# Patient Record
Sex: FEMALE | Race: BLACK OR AFRICAN AMERICAN | Employment: OTHER | URBAN - METROPOLITAN AREA
[De-identification: names, ages, dates, MRNs, and addresses within clinical notes are randomized per-mention and may not be internally consistent; named-entity substitution may affect disease eponyms.]

---

## 2018-06-15 ENCOUNTER — APPOINTMENT (OUTPATIENT)
Dept: GENERAL RADIOLOGY | Age: 78
DRG: 698 | End: 2018-06-15
Attending: EMERGENCY MEDICINE
Payer: MEDICARE

## 2018-06-15 ENCOUNTER — HOSPITAL ENCOUNTER (INPATIENT)
Age: 78
LOS: 12 days | Discharge: NURSING, REHAB OR LONG-TERM CARE | DRG: 698 | End: 2018-06-27
Attending: EMERGENCY MEDICINE | Admitting: HOSPITALIST
Payer: MEDICARE

## 2018-06-15 DIAGNOSIS — M24.50 FLEXION CONTRACTURES: ICD-10-CM

## 2018-06-15 DIAGNOSIS — R53.81 DEBILITY: ICD-10-CM

## 2018-06-15 DIAGNOSIS — R50.9 FEVER, UNSPECIFIED FEVER CAUSE: Primary | ICD-10-CM

## 2018-06-15 DIAGNOSIS — R63.0 POOR APPETITE: ICD-10-CM

## 2018-06-15 DIAGNOSIS — R13.12 OROPHARYNGEAL DYSPHAGIA: ICD-10-CM

## 2018-06-15 DIAGNOSIS — R34 OLIGURIA: ICD-10-CM

## 2018-06-15 DIAGNOSIS — N39.0 URINARY TRACT INFECTION WITHOUT HEMATURIA, SITE UNSPECIFIED: ICD-10-CM

## 2018-06-15 PROBLEM — T83.511A CATHETER-ASSOCIATED URINARY TRACT INFECTION (HCC): Status: ACTIVE | Noted: 2018-06-15

## 2018-06-15 PROBLEM — R19.7 DIARRHEA: Status: ACTIVE | Noted: 2018-06-15

## 2018-06-15 PROBLEM — A41.9 SEPSIS (HCC): Status: ACTIVE | Noted: 2018-06-15

## 2018-06-15 PROBLEM — R53.2 FUNCTIONAL QUADRIPLEGIA (HCC): Status: ACTIVE | Noted: 2018-06-15

## 2018-06-15 LAB
ALBUMIN SERPL-MCNC: 2.8 G/DL (ref 3.5–5)
ALBUMIN/GLOB SERPL: 0.6 {RATIO} (ref 1.1–2.2)
ALP SERPL-CCNC: 78 U/L (ref 45–117)
ALT SERPL-CCNC: 18 U/L (ref 12–78)
ANION GAP SERPL CALC-SCNC: 12 MMOL/L (ref 5–15)
APPEARANCE UR: ABNORMAL
AST SERPL-CCNC: 44 U/L (ref 15–37)
BACTERIA URNS QL MICRO: ABNORMAL /HPF
BASOPHILS # BLD: 0 K/UL (ref 0–0.1)
BASOPHILS NFR BLD: 0 % (ref 0–1)
BILIRUB SERPL-MCNC: 0.6 MG/DL (ref 0.2–1)
BILIRUB UR QL CFM: NEGATIVE
BUN SERPL-MCNC: 45 MG/DL (ref 6–20)
BUN/CREAT SERPL: 26 (ref 12–20)
CALCIUM SERPL-MCNC: 9 MG/DL (ref 8.5–10.1)
CHLORIDE SERPL-SCNC: 103 MMOL/L (ref 97–108)
CO2 SERPL-SCNC: 20 MMOL/L (ref 21–32)
COLOR UR: ABNORMAL
CREAT SERPL-MCNC: 1.76 MG/DL (ref 0.55–1.02)
DIFFERENTIAL METHOD BLD: ABNORMAL
EOSINOPHIL # BLD: 0 K/UL (ref 0–0.4)
EOSINOPHIL NFR BLD: 0 % (ref 0–7)
EPITH CASTS URNS QL MICRO: ABNORMAL /LPF
ERYTHROCYTE [DISTWIDTH] IN BLOOD BY AUTOMATED COUNT: 14.8 % (ref 11.5–14.5)
GLOBULIN SER CALC-MCNC: 4.6 G/DL (ref 2–4)
GLUCOSE SERPL-MCNC: 164 MG/DL (ref 65–100)
GLUCOSE UR STRIP.AUTO-MCNC: NEGATIVE MG/DL
HCT VFR BLD AUTO: 38.1 % (ref 35–47)
HGB BLD-MCNC: 12.8 G/DL (ref 11.5–16)
HGB UR QL STRIP: ABNORMAL
IMM GRANULOCYTES # BLD: 0 K/UL
IMM GRANULOCYTES NFR BLD AUTO: 0 %
KETONES UR QL STRIP.AUTO: ABNORMAL MG/DL
LACTATE SERPL-SCNC: 2.9 MMOL/L (ref 0.4–2)
LEUKOCYTE ESTERASE UR QL STRIP.AUTO: ABNORMAL
LYMPHOCYTES # BLD: 1 K/UL (ref 0.8–3.5)
LYMPHOCYTES NFR BLD: 7 % (ref 12–49)
MCH RBC QN AUTO: 31.3 PG (ref 26–34)
MCHC RBC AUTO-ENTMCNC: 33.6 G/DL (ref 30–36.5)
MCV RBC AUTO: 93.2 FL (ref 80–99)
MONOCYTES # BLD: 1.9 K/UL (ref 0–1)
MONOCYTES NFR BLD: 13 % (ref 5–13)
NEUTS BAND NFR BLD MANUAL: 17 % (ref 0–6)
NEUTS SEG # BLD: 11.4 K/UL (ref 1.8–8)
NEUTS SEG NFR BLD: 63 % (ref 32–75)
NITRITE UR QL STRIP.AUTO: NEGATIVE
NRBC # BLD: 0 K/UL (ref 0–0.01)
NRBC BLD-RTO: 0 PER 100 WBC
PH UR STRIP: 5 [PH] (ref 5–8)
PLATELET # BLD AUTO: 193 K/UL (ref 150–400)
PMV BLD AUTO: 10.9 FL (ref 8.9–12.9)
POTASSIUM SERPL-SCNC: 5.3 MMOL/L (ref 3.5–5.1)
PROT SERPL-MCNC: 7.4 G/DL (ref 6.4–8.2)
PROT UR STRIP-MCNC: 30 MG/DL
RBC # BLD AUTO: 4.09 M/UL (ref 3.8–5.2)
RBC #/AREA URNS HPF: ABNORMAL /HPF (ref 0–5)
RBC MORPH BLD: ABNORMAL
SODIUM SERPL-SCNC: 135 MMOL/L (ref 136–145)
SP GR UR REFRACTOMETRY: 1.02 (ref 1–1.03)
UA: UC IF INDICATED,UAUC: ABNORMAL
UROBILINOGEN UR QL STRIP.AUTO: 1 EU/DL (ref 0.2–1)
WBC # BLD AUTO: 14.3 K/UL (ref 3.6–11)
WBC URNS QL MICRO: ABNORMAL /HPF (ref 0–4)

## 2018-06-15 PROCEDURE — 74011250637 HC RX REV CODE- 250/637

## 2018-06-15 PROCEDURE — 74011250636 HC RX REV CODE- 250/636: Performed by: EMERGENCY MEDICINE

## 2018-06-15 PROCEDURE — 65660000001 HC RM ICU INTERMED STEPDOWN

## 2018-06-15 PROCEDURE — 93005 ELECTROCARDIOGRAM TRACING: CPT

## 2018-06-15 PROCEDURE — 96360 HYDRATION IV INFUSION INIT: CPT

## 2018-06-15 PROCEDURE — 80053 COMPREHEN METABOLIC PANEL: CPT | Performed by: EMERGENCY MEDICINE

## 2018-06-15 PROCEDURE — 87493 C DIFF AMPLIFIED PROBE: CPT | Performed by: HOSPITALIST

## 2018-06-15 PROCEDURE — 36415 COLL VENOUS BLD VENIPUNCTURE: CPT | Performed by: EMERGENCY MEDICINE

## 2018-06-15 PROCEDURE — 87086 URINE CULTURE/COLONY COUNT: CPT | Performed by: EMERGENCY MEDICINE

## 2018-06-15 PROCEDURE — 71045 X-RAY EXAM CHEST 1 VIEW: CPT

## 2018-06-15 PROCEDURE — 74011000258 HC RX REV CODE- 258: Performed by: EMERGENCY MEDICINE

## 2018-06-15 PROCEDURE — 99285 EMERGENCY DEPT VISIT HI MDM: CPT

## 2018-06-15 PROCEDURE — 74011250636 HC RX REV CODE- 250/636: Performed by: HOSPITALIST

## 2018-06-15 PROCEDURE — 87077 CULTURE AEROBIC IDENTIFY: CPT | Performed by: EMERGENCY MEDICINE

## 2018-06-15 PROCEDURE — 83605 ASSAY OF LACTIC ACID: CPT | Performed by: EMERGENCY MEDICINE

## 2018-06-15 PROCEDURE — 81001 URINALYSIS AUTO W/SCOPE: CPT | Performed by: EMERGENCY MEDICINE

## 2018-06-15 PROCEDURE — 85025 COMPLETE CBC W/AUTO DIFF WBC: CPT | Performed by: EMERGENCY MEDICINE

## 2018-06-15 PROCEDURE — 87186 SC STD MICRODIL/AGAR DIL: CPT | Performed by: EMERGENCY MEDICINE

## 2018-06-15 PROCEDURE — 87040 BLOOD CULTURE FOR BACTERIA: CPT | Performed by: EMERGENCY MEDICINE

## 2018-06-15 RX ORDER — LISINOPRIL 2.5 MG/1
2.5 TABLET ORAL DAILY
COMMUNITY

## 2018-06-15 RX ORDER — SENNOSIDES 8.6 MG/1
1 TABLET ORAL 2 TIMES DAILY
COMMUNITY
End: 2018-06-27

## 2018-06-15 RX ORDER — ACETAMINOPHEN 650 MG/1
650 SUPPOSITORY RECTAL
Status: COMPLETED | OUTPATIENT
Start: 2018-06-15 | End: 2018-06-15

## 2018-06-15 RX ORDER — THERA TABS 400 MCG
1 TAB ORAL DAILY
COMMUNITY

## 2018-06-15 RX ORDER — OXYCODONE AND ACETAMINOPHEN 5; 325 MG/1; MG/1
2 TABLET ORAL EVERY 8 HOURS
COMMUNITY
End: 2018-06-27

## 2018-06-15 RX ORDER — ATROPINE SULFATE 10 MG/ML
1-2 SOLUTION/ DROPS OPHTHALMIC 3 TIMES DAILY
COMMUNITY

## 2018-06-15 RX ORDER — ONDANSETRON 2 MG/ML
4 INJECTION INTRAMUSCULAR; INTRAVENOUS
Status: DISCONTINUED | OUTPATIENT
Start: 2018-06-15 | End: 2018-06-27 | Stop reason: HOSPADM

## 2018-06-15 RX ORDER — SODIUM CHLORIDE 9 MG/ML
50 INJECTION, SOLUTION INTRAVENOUS CONTINUOUS
Status: DISCONTINUED | OUTPATIENT
Start: 2018-06-15 | End: 2018-06-17

## 2018-06-15 RX ORDER — IPRATROPIUM BROMIDE AND ALBUTEROL SULFATE 2.5; .5 MG/3ML; MG/3ML
3 SOLUTION RESPIRATORY (INHALATION)
Status: DISCONTINUED | OUTPATIENT
Start: 2018-06-15 | End: 2018-06-27 | Stop reason: HOSPADM

## 2018-06-15 RX ORDER — LORATADINE 10 MG/1
10 TABLET ORAL
COMMUNITY

## 2018-06-15 RX ORDER — METRONIDAZOLE 500 MG/100ML
500 INJECTION, SOLUTION INTRAVENOUS EVERY 8 HOURS
Status: DISCONTINUED | OUTPATIENT
Start: 2018-06-15 | End: 2018-06-17

## 2018-06-15 RX ORDER — ACETAMINOPHEN 650 MG/1
SUPPOSITORY RECTAL
Status: COMPLETED
Start: 2018-06-15 | End: 2018-06-15

## 2018-06-15 RX ORDER — NALOXONE HYDROCHLORIDE 0.4 MG/ML
0.4 INJECTION, SOLUTION INTRAMUSCULAR; INTRAVENOUS; SUBCUTANEOUS AS NEEDED
Status: DISCONTINUED | OUTPATIENT
Start: 2018-06-15 | End: 2018-06-27 | Stop reason: HOSPADM

## 2018-06-15 RX ORDER — IPRATROPIUM BROMIDE AND ALBUTEROL SULFATE 2.5; .5 MG/3ML; MG/3ML
3 SOLUTION RESPIRATORY (INHALATION)
COMMUNITY

## 2018-06-15 RX ORDER — SODIUM CHLORIDE 0.9 % (FLUSH) 0.9 %
5-10 SYRINGE (ML) INJECTION EVERY 8 HOURS
Status: DISCONTINUED | OUTPATIENT
Start: 2018-06-15 | End: 2018-06-27 | Stop reason: HOSPADM

## 2018-06-15 RX ORDER — LOPERAMIDE HYDROCHLORIDE 2 MG/1
CAPSULE ORAL
COMMUNITY

## 2018-06-15 RX ORDER — GABAPENTIN 100 MG/1
100 CAPSULE ORAL 3 TIMES DAILY
COMMUNITY

## 2018-06-15 RX ORDER — PROMETHAZINE HYDROCHLORIDE 25 MG/1
25 TABLET ORAL
COMMUNITY

## 2018-06-15 RX ORDER — TAMSULOSIN HYDROCHLORIDE 0.4 MG/1
0.4 CAPSULE ORAL
COMMUNITY

## 2018-06-15 RX ORDER — ACETIC ACID 0.25 G/100ML
IRRIGANT IRRIGATION DAILY
COMMUNITY

## 2018-06-15 RX ORDER — ENOXAPARIN SODIUM 100 MG/ML
40 INJECTION SUBCUTANEOUS EVERY 24 HOURS
Status: DISCONTINUED | OUTPATIENT
Start: 2018-06-15 | End: 2018-06-27 | Stop reason: HOSPADM

## 2018-06-15 RX ORDER — PANTOPRAZOLE SODIUM 40 MG/1
40 TABLET, DELAYED RELEASE ORAL DAILY
COMMUNITY

## 2018-06-15 RX ORDER — SODIUM CHLORIDE 0.9 % (FLUSH) 0.9 %
5-10 SYRINGE (ML) INJECTION AS NEEDED
Status: DISCONTINUED | OUTPATIENT
Start: 2018-06-15 | End: 2018-06-27 | Stop reason: HOSPADM

## 2018-06-15 RX ORDER — ACETAMINOPHEN 325 MG/1
650 TABLET ORAL
COMMUNITY

## 2018-06-15 RX ORDER — HYDROCHLOROTHIAZIDE 12.5 MG/1
12.5 TABLET ORAL DAILY
COMMUNITY

## 2018-06-15 RX ORDER — POTASSIUM CHLORIDE 20 MEQ/1
20 TABLET, EXTENDED RELEASE ORAL DAILY
Status: ON HOLD | COMMUNITY
End: 2018-06-27

## 2018-06-15 RX ADMIN — ACETAMINOPHEN 650 MG: 650 SUPPOSITORY RECTAL at 18:42

## 2018-06-15 RX ADMIN — PIPERACILLIN SODIUM AND TAZOBACTAM SODIUM 3.38 G: 3; .375 INJECTION, POWDER, LYOPHILIZED, FOR SOLUTION INTRAVENOUS at 21:10

## 2018-06-15 RX ADMIN — SODIUM CHLORIDE 1000 ML: 900 INJECTION, SOLUTION INTRAVENOUS at 21:10

## 2018-06-15 RX ADMIN — SODIUM CHLORIDE 1000 ML: 900 INJECTION, SOLUTION INTRAVENOUS at 18:58

## 2018-06-15 RX ADMIN — Medication 10 ML: at 23:00

## 2018-06-15 RX ADMIN — SODIUM CHLORIDE 125 ML/HR: 900 INJECTION, SOLUTION INTRAVENOUS at 22:42

## 2018-06-15 NOTE — ED PROVIDER NOTES
HPI Comments: 68 y.o. female with past medical history significant for brain injury who presents from [facility] via EMS with chief complaint of fever. As per EMS, the started to have a fever of 104 this morning. The pt was given Tylenol at 1530 and which brought her temperature down to 100.3. The pt was also having episodes of vomiting one day ago which resolved. The facility noticed that the pt's urine was darker than normal and she has had a reduced appetite today. The pt is nonverbal and contractured at baseline. EMS recorded a blood pressure of 129/88 with a heart rate of 100. They also report that the facility was concerned that the pt may have aspirated. The pt is in long term care. There are no other acute medical concerns at this time. PCP: No primary care provider on file. Full history, physical exam, and ROS unable to be obtained due to:  nonverbal.    Note written by Lisa Lan, as dictated by David Boogie MD 6:43 PM      The history is provided by the patient. No  was used. No past medical history on file. No past surgical history on file. No family history on file. Social History     Social History    Marital status: N/A     Spouse name: N/A    Number of children: N/A    Years of education: N/A     Occupational History    Not on file. Social History Main Topics    Smoking status: Not on file    Smokeless tobacco: Not on file    Alcohol use Not on file    Drug use: Not on file    Sexual activity: Not on file     Other Topics Concern    Not on file     Social History Narrative         ALLERGIES: Review of patient's allergies indicates no known allergies. Review of Systems   Unable to perform ROS: Patient nonverbal       There were no vitals filed for this visit. Physical Exam   Constitutional:   chornically ill appearing; Alopecia; Old mosquera with purulent drainage;     HENT:   Head: Normocephalic and atraumatic. Mouth/Throat: Oropharynx is clear and moist.   Eyes: EOM are normal. Pupils are equal, round, and reactive to light. Neck: Normal range of motion. Neck supple. Cardiovascular: Normal rate, regular rhythm, normal heart sounds and intact distal pulses. Exam reveals no gallop and no friction rub. No murmur heard. Pulmonary/Chest: Effort normal. No respiratory distress. She has no wheezes. She has no rales. Abdominal: Soft. There is tenderness (lower abdomen;). There is no rebound. Musculoskeletal: Normal range of motion. She exhibits no tenderness. Neurological: She is alert. No cranial nerve deficit. Contractures of upper extremities; No clonus;  quadriplegic like neurological exam;  Follows simple commands;  Makes eye contact but appears obtunded;     Skin: No erythema. No decubitus;     Psychiatric: She has a normal mood and affect. Her behavior is normal.   Nursing note and vitals reviewed. Note written by Lisa Gordon, as dictated by Ezequiel Lao MD 6:51 PM    MDM  Number of Diagnoses or Management Options  Fever, unspecified fever cause:   Urinary tract infection without hematuria, site unspecified:   Critical Care  Total time providing critical care: 30-74 minutes  Total critical care time spent exclusive of procedures:  40 minutes        ED Course       Procedures           ED EKG interpretation:  Rhythm: normal sinus rhythm; and regular . Rate (approx.): 90; Axis: normal; P wave: normal; QRS interval: normal ; ST/T wave: non-specific changes; in  Lead: ; Other findings: . This EKG was interpreted by Ezequiel Lao MD,ED Provider. 8:07 PM    8:20 PM  Urine was not sent earlier because there was no urine output when they replaced the mosquera. The pt has not made 5mL of urine which will be sent. Holding back on abx until the urine comes back. Will order a second bag of normal saline. Note: Patient is very debilitated apparently from a head injury.  She has a spastic quadriplegia sort of exam but the contractures are much worse in the upper extremities. She does understand questions and follows simple commands such as closing eyes and sticking out tongue. She has an indwelling Schafer which has been replaced in the ER. Urine returns with leukoesterase large. There was a question of aspiration at her long-term care facility but she does not seem to be having a lot of pulmonary symptoms and the chest x-ray does not reveal a source of fever. Blood cultures are pending. Patient is receiving a second liter of normal saline. Zosyn has been ordered. The hospitalist will be consulted.   Tamika Trejo MD  8:40 PM

## 2018-06-15 NOTE — IP AVS SNAPSHOT
[address] 
[phone] Patient: Cristela Clayton MRN: [id number] WPB:[id number]/[id number] You are allergic to the following No active allergies Recent Documentation Height Weight BMI Smoking Status 1.575 m 49.2 kg 19.84 kg/m2 Never Assessed Emergency Contacts  (Rel.) Home Phone Work Phone Mobile Phone Olvin Marcum (Other Relative) [phone] -- -- About your hospitalization You were admitted on:  Izabella 15, 2018 You last received care in the:  [facility] You were discharged on:  June 27, 2018 Why you were hospitalized Your primary diagnosis was:  Sepsis (Hcc) Your diagnoses also included:  Catheter-Associated Urinary Tract Infection (Hcc), Diarrhea, Functional Quadriplegia (Hcc) Providers Seen During Your Hospitalization Provider Specialty Primary office phone Mary Jones MD Emergency Medicine [phone] Sergey Sanchez MD Internal Medicine [phone] Saúl Peck MD Internal Medicine [phone] Rosy Mcrae MD Internal Medicine [phone] Enrrique Berumen MD Internal Medicine [phone] Your Primary Care Physician (PCP) Primary Care Physician Office Phone Office Fax Gypsy Herzog [phone] Follow-up Information Follow up With Details Comments Contact Info Rika Valdez MD In 1 week  [address] 
[phone] Rick HERNANDEZ [facility]   [address] 
[phone] My Medications STOP taking these medications   
 oxyCODONE-acetaminophen 5-325 mg per tablet Commonly known as:  PERCOCET  
   
  
 SENEXON 8.6 mg tablet Generic drug:  senna TAKE these medications as instructed Instructions Each Dose to Equal  
 Morning Noon Evening Bedtime  
 acetaminophen 325 mg tablet Commonly known as:  TYLENOL Your last dose was: Your next dose is: Take 650 mg by mouth every four (4) hours as needed for Pain (Do not exceed 3 g /24 hours). 650 mg  
    
   
   
   
  
 acetic acid 0.25 % irrigation Your last dose was: Your next dose is:    
   
   
 by Irrigation route daily. Mix 30 mL of acetic acid 0.25% with 60 mL of water and use to irrigate mosquera catheter 3x a week on M-W-F  
     
   
   
   
  
 albuterol-ipratropium 2.5 mg-0.5 mg/3 ml Nebu Commonly known as:  Shayna Butts Your last dose was: Your next dose is:    
   
   
 3 mL by Nebulization route every four (4) hours as needed. 3 mL  
    
   
   
   
  
 atropine 1 % ophthalmic solution Your last dose was: Your next dose is:    
   
   
 1-2 Drops by SubLINGual route three (3) times daily. Scheduled before meals-for secretions 1-2 Drop  
    
   
   
   
  
 cholestyramine-aspartame 4 gram packet Commonly known as:  Philly Franco Your last dose was: Your next dose is: Take 1 Packet by mouth two (2) times a day. 4 g  
    
   
   
   
  
 DEEP SEA NASAL NA Your last dose was: Your next dose is:    
   
   
 1 Spray by Nasal route every two (2) hours as needed for Other (congestion). 1 Spray  
    
   
   
   
  
 gabapentin 100 mg capsule Commonly known as:  NEURONTIN Your last dose was: Your next dose is: Take 100 mg by mouth three (3) times daily. 100 mg  
    
   
   
   
  
 hydroCHLOROthiazide 12.5 mg tablet Commonly known as:  HYDRODIURIL Your last dose was: Your next dose is: Take 12.5 mg by mouth daily. 12.5 mg  
    
   
   
   
  
 L. acidoph & paracasei- S therm- Bifido 8 billion cell Cap cap Commonly known as:  RAYSHAWN-Q/RISAQUAD Your last dose was: Your next dose is: Take 1 Cap by mouth daily. 1 Cap lisinopril 2.5 mg tablet Commonly known as:  Junior Dey Your last dose was: Your next dose is: Take 2.5 mg by mouth daily. 2.5 mg  
    
   
   
   
  
 loperamide 2 mg capsule Commonly known as:  IMODIUM Your last dose was: Your next dose is: Take  by mouth four (4) times daily as needed for Diarrhea.  
     
   
   
   
  
 loratadine 10 mg tablet Commonly known as:  Edward Martin Your last dose was: Your next dose is: Take 10 mg by mouth daily as needed for Allergies. 10 mg  
    
   
   
   
  
 oxyCODONE IR 5 mg immediate release tablet Commonly known as:  Buren Linus Your last dose was: Your next dose is: Take 0.5 Tabs by mouth every four (4) hours as needed. Max Daily Amount: 15 mg.  
 2.5 mg  
    
   
   
   
  
 pantoprazole 40 mg tablet Commonly known as:  PROTONIX Your last dose was: Your next dose is: Take 40 mg by mouth daily. 40 mg  
    
   
   
   
  
 potassium chloride 20 mEq tablet Commonly known as:  K-DUR, KLOR-CON Your last dose was: Your next dose is: Take 2 Tabs by mouth daily. 40 mEq  
    
   
   
   
  
 promethazine 25 mg tablet Commonly known as:  PHENERGAN Your last dose was: Your next dose is: Take 25 mg by mouth every four (4) hours as needed for Nausea. 25 mg  
    
   
   
   
  
 ROBAFEN CF 30- mg/5 mL solution Generic drug:  PSEUDOEPHEDRINE-dextromethorphan-guaiFENesin Your last dose was: Your next dose is: Take 5 mL by mouth every four (4) hours as needed for Cough. 5 mL  
    
   
   
   
  
 tamsulosin 0.4 mg capsule Commonly known as:  FLOMAX Your last dose was: Your next dose is: Take 0.4 mg by mouth nightly. Indications: urinary retention  0.4 mg  
    
   
   
   
  
 therapeutic multivitamin tablet Commonly known as:  [facility] Your last dose was: Your next dose is: Take 1 Tab by mouth daily. 1 Tab VIRT-GURWINDER PO Your last dose was: Your next dose is: Take 1 Tab by mouth daily. Supplement 1 Tab Where to Get Your Medications Information on where to get these meds will be given to you by the nurse or doctor. ! Ask your nurse or doctor about these medications  
  cholestyramine-aspartame 4 gram packet L. acidoph & paracasei- S therm- Bifido 8 billion cell Cap cap  
 oxyCODONE IR 5 mg immediate release tablet  
 potassium chloride 20 mEq tablet Discharge Instructions Discharge Summary  
  
PATIENT ID: Jese Sow MRN: [id number] YOB: 1940 DATE OF ADMISSION: 6/15/2018  5:54 PM   
DATE OF DISCHARGE: 6/27/2018 PRIMARY CARE PROVIDER: Angélica Diaz MD  
ATTENDING PHYSICIAN: Tata Toure MD 
DISCHARGING PROVIDER: Contreras Lofton NP. To contact this individual call [phone] and ask the  to page. If unavailable ask to be transferred the Adult Hospitalist Department. 
  
CONSULTATIONS: IP CONSULT TO HOSPITALIST 
IP CONSULT TO [address] [place] [address]:  
Pt presented to the ED with fever. The patient was found to have temperature of 104 the am of admit at her LTC facility - she was given tylenol which brought down her temp to 100.3. She also had an episode of vomiting/poor appetite and her urine was darker than usual.  EMS indicated that staff at [address] [place] [address] was concerned pt aspirated. 
  
DISCHARGE DIAGNOSES / PLAN:   
  
Severe sepsis (hypotension, fever, lactic acidosis, leukocytosis with UTI) POA: Juanita Sax - + pyuria. UA was turbid with + large leuks, WBC 20-50 and 4+ bacteria. UTI has been treated with Rocephin - + watery liquid stool - c-diff NEGATIVE -> Flagyl stopped (6/17) 
- lactic acidosis resolved  
   
Dysphagia: 
- speech evaluated: puree diet with nectar thick liquids 
   
CAUTI (long-term chronic mosquera) (POA): - E-coli and Providenecia UTI - treated with 5 days of ceftriaxone - ED changed catheter per nursing report 
   
Hypokalemia: increase daily K on discharge 
   
Hypernatremia: resolved 
   
Hx of hypertension: resume home meds 
   
Diarrhea (POA): 
- stool culture is negative, c-diff negative 
- lactose free diet (added restriction to diet order) - Recommend immodium when needed. - questran BID if pt able to tolerate 
   
BENNY/Dehydration with elevated Creatinine: Resolved 
   
Hx Brain injury with functional quadriplegia:   
- Bed bound, Supportive care, reposition patient 
- communicates with simple words 
   
Failure to Thrive - BMI 19.84, normal weight 
- decreased intake 2/2 dysphagia 
- nephew and patient do not want PEG in future 
  
Palliative care has seen pt this admit, mPOA paperwork completed naming Caryle Friar (nephew) her mPOA. DDNR completed. Nephew is aware that if pt continues to decline, pt may be appropriate for hospice services.  
  
FOLLOW UP APPOINTMENTS:   
Follow-up Information Follow up With Details Comments Contact Info  
  Laquita Cazares MD In 1 week   [address] 
[phone] ADDITIONAL CARE RECOMMENDATIONS:  
- consult to Dietitian 
- encourage po intakes - pt has not been eating well during admit 
  
DIET: Pureed Diet, Nectar Thick. Lactose Free Oral Nutritional Supplements: Ensure 2-3 times daily 
  
ACTIVITY: Bedrest, non ambulatory 
  
DISCHARGE MEDICATIONS: 
     
Current Discharge Medication List  
   
     
START taking these medications  
  Details L. acidoph & paracasei- S therm- Bifido (RAYSHAWN-Q/RISAQUAD) 8 billion cell cap cap Take 1 Cap by mouth daily.  
Qty: 30 Cap, Refills: 0  
   
 cholestyramine-aspartame (QUESTRAN LIGHT) 4 gram packet Take 1 Packet by mouth two (2) times a day. Qty: 60 Packet, Refills: 0  
   
oxyCODONE IR (ROXICODONE) 5 mg immediate release tablet Take 0.5 Tabs by mouth every four (4) hours as needed. Max Daily Amount: 15 mg. 
Qty: 10 Tab, Refills: 0  
  Associated Diagnoses: Flexion contractures  
   
   
     
CONTINUE these medications which have CHANGED  
  Details  
potassium chloride (K-DUR, KLOR-CON) 20 mEq tablet Take 2 Tabs by mouth daily. Qty: 60 Tab, Refills: 0  
   
   
     
CONTINUE these medications which have NOT CHANGED  
  Details  
acetic acid 0.25 % irrigation by Irrigation route daily. Mix 30 mL of acetic acid 0.25% with 60 mL of water and use to irrigate mosquera catheter 3x a week on M-W-F  
   
atropine 1 % ophthalmic solution 1-2 Drops by SubLINGual route three (3) times daily.  Scheduled before meals-for secretions  
   
acetaminophen (TYLENOL) 325 mg tablet Take 650 mg by mouth every four (4) hours as needed for Pain (Do not exceed 3 g /24 hours).  
   
sodium chloride (DEEP SEA NASAL NA) 1 [place] by Nasal route every two (2) hours as needed for Other (congestion).  
   
gabapentin (NEURONTIN) 100 mg capsule Take 100 mg by mouth three (3) times daily.  
   
hydroCHLOROthiazide (HYDRODIURIL) 12.5 mg tablet Take 12.5 mg by mouth daily.  
   
albuterol-ipratropium (DUO-NEB) 2.5 mg-0.5 mg/3 ml nebu 3 mL by Nebulization route every four (4) hours as needed.  
   
lisinopril (PRINIVIL, ZESTRIL) 2.5 mg tablet Take 2.5 mg by mouth daily.  
   
loperamide (IMODIUM) 2 mg capsule Take  by mouth four (4) times daily as needed for Diarrhea.  
   
loratadine (CLARITIN) 10 mg tablet Take 10 mg by mouth daily as needed for Allergies.  
   
pantoprazole (PROTONIX) 40 mg tablet Take 40 mg by mouth daily.  
   
promethazine (PHENERGAN) 25 mg tablet Take 25 mg by mouth every four (4) hours as needed for Nausea.  
   
 PSEUDOEPHEDRINE-dextromethorphan-guaiFENesin (ROBAFEN CF) 30- mg/5 mL solution Take 5 mL by mouth every four (4) hours as needed for Cough.  
   
therapeutic multivitamin (THERAGRAN) tablet Take 1 Tab by mouth daily.  
   
tamsulosin (FLOMAX) 0.4 mg capsule Take 0.4 mg by mouth nightly. Indications: urinary retention  
   
cyanocobalamin/folic ac/vit B6 (VIRT-GURWINDER PO) Take 1 Tab by mouth daily. Supplement  
   
   
    
STOP taking these medications  
   
  oxyCODONE-acetaminophen (PERCOCET) 5-325 mg per tablet Comments:  
Reason for Stopping:   
     
  senna (SENEXON) 8.6 mg tablet Comments:  
Reason for Stopping:   
     
   
  
NOTIFY YOUR PHYSICIAN FOR ANY OF THE FOLLOWING:  
Fever over 101 degrees for 24 hours. Chest pain, shortness of breath, fever, chills, nausea, vomiting, diarrhea, change in mentation, falling, weakness, bleeding. Severe pain or pain not relieved by medications. Or, any other signs or symptoms that you may have questions about. 
  
DISPOSITION: 
   Home With: 
  OT   PT   HH   RN  
  
xx Long term SNF/Inpatient Rehab  
  Independent/assisted living  
  Hospice  
  Other:  
  
PATIENT CONDITION AT DISCHARGE:  
Functional status  
xx Poor   
  Deconditioned   
  Independent   
  
Cognition  
xx  Lucid   
  Forgetful   
  Dementia   
  
Catheters/lines (plus indication)  
xx Schafer   
  PICC   
  PEG   
  None   
  
Code status  
   Full code   
xx DNR   
  
PHYSICAL EXAMINATION AT DISCHARGE: 
BP (!) 165/93 (BP 1 Location: Right leg, BP Patient Position: At rest)  Pulse 77  Temp 98.1 °F (36.7 °C)  Resp 18  Ht 5' 2\" (1.575 m)  Wt 49.2 kg (108 lb 7.5 oz)  SpO2 100%  BMI 19.84 kg/m2  
  
Pt in bed, turned by turn team and reports she is comfortable.  Called nephew and discussed plans for discharge today - he is in agreement and verbalizes understanding that hospice services may be requested once patient returns to [place], if she continues to decline.  
  
 Constitutional:  No acute distress, cooperative ENT:  Oral mucous membranes moist. Lower teeth w/thick plaque. Lower jaw and lip protrudes Resp:  CTA bilaterally. No wheezing. No accessory muscle use and on RA  
CV:  Regular rhythm, normal rate, no murmurs.  
 GI:  Soft, non distended, non-tender. Normoactive bowel sounds. + Flexiseal in place, decreased output and has orders to d/c.  
 Musculoskeletal:  No edema, warm, 2+ pulses throughout  
 Neurologic:  Moves all extremities.  Alert and oriented to person/family, place and time Lines: Schafer (chronic) with hazy yellow urine. Changed on admit.  
   
CHRONIC MEDICAL DIAGNOSES: 
Problem List as of 6/27/2018  Date Reviewed: 6/27/2018  
          Codes Class Noted - Resolved  
  Functional quadriplegia ([facility] ICD-10-CM: R53.2 ICD-9-CM: 780.72   6/15/2018 - Present  
     
  * (Principal)RESOLVED: Sepsis ([facility] ICD-10-CM: A41.9 ICD-9-CM: 038.9, 995.91   6/15/2018 - 6/27/2018  
     
  RESOLVED: Catheter-associated urinary tract infection (HCC) ICD-10-CM: T83.511A, N39.0 ICD-9-CM: 996.64, 599.0   6/15/2018 - 6/27/2018  
     
  RESOLVED: Diarrhea ICD-10-CM: R19.7 ICD-9-CM: 787.91   6/15/2018 - 6/27/2018  
     
  
  
Greater than 30 minutes were spent with the patient on counseling and coordination of care 
  
Signed:  
Kaley Carolina NP 
6/27/2018 
8:34 AM 
 
 
Discharge Orders None [facility] Announcement We are excited to announce that we are making your provider's discharge notes available to you in [employer]. You will see these notes when they are completed and signed by the physician that discharged you from your recent hospital stay. If you have any questions or concerns about any information you see in [employer], please call the Health Information Department where you were seen or reach out to your Primary Care Provider for more information about your plan of care. Introducing [facility]! Tere Licona introduces [employer] patient portal. Now you can access parts of your medical record, email your doctor's office, and request medication refills online. 1. In your internet browser, go to [email or web address] 2. Click on the First Time User? Click Here link in the Sign In box. You will see the New Member Sign Up page. 3. Enter your [employer] Access Code exactly as it appears below. You will not need to use this code after youve completed the sign-up process. If you do not sign up before the expiration date, you must request a new code. · [employer] Access Code: [email or web address] Expires: 9/13/2018  5:45 PM 
 
4. Enter the last four digits of your Social Security Number (xxxx) and Date of Birth (mm/dd/yyyy) as indicated and click Submit. You will be taken to the next sign-up page. 5. Create a [employer] ID. This will be your [employer] login ID and cannot be changed, so think of one that is secure and easy to remember. 6. Create a [employer] password. You can change your password at any time. 7. Enter your Password Reset Question and Answer. This can be used at a later time if you forget your password. 8. Enter your e-mail address. You will receive e-mail notification when new information is available in [address] 9. Click Sign Up. You can now view and download portions of your medical record. 10. Click the Download Summary menu link to download a portable copy of your medical information. If you have questions, please visit the Frequently Asked Questions section of the [employer] website. Remember, [employer] is NOT to be used for urgent needs. For medical emergencies, dial 911. Now available from your iPhone and Android! General Information Please provide this summary of care documentation to your next provider. Patient Signature:  ____________________________________________________________ Date:  ____________________________________________________________  
  
Sissy Lapine Provider Signature:  ____________________________________________________________ Date:  ____________________________________________________________

## 2018-06-16 LAB
ANION GAP SERPL CALC-SCNC: 9 MMOL/L (ref 5–15)
BASOPHILS # BLD: 0 K/UL (ref 0–0.1)
BASOPHILS NFR BLD: 0 % (ref 0–1)
BUN SERPL-MCNC: 39 MG/DL (ref 6–20)
BUN/CREAT SERPL: 37 (ref 12–20)
C DIFF TOX GENS STL QL NAA+PROBE: NEGATIVE
CALCIUM SERPL-MCNC: 8 MG/DL (ref 8.5–10.1)
CHLORIDE SERPL-SCNC: 113 MMOL/L (ref 97–108)
CO2 SERPL-SCNC: 22 MMOL/L (ref 21–32)
CREAT SERPL-MCNC: 1.06 MG/DL (ref 0.55–1.02)
DIFFERENTIAL METHOD BLD: ABNORMAL
EOSINOPHIL # BLD: 0 K/UL (ref 0–0.4)
EOSINOPHIL NFR BLD: 0 % (ref 0–7)
ERYTHROCYTE [DISTWIDTH] IN BLOOD BY AUTOMATED COUNT: 14.7 % (ref 11.5–14.5)
GLUCOSE SERPL-MCNC: 93 MG/DL (ref 65–100)
HCT VFR BLD AUTO: 31.5 % (ref 35–47)
HGB BLD-MCNC: 10.4 G/DL (ref 11.5–16)
IMM GRANULOCYTES # BLD: 0 K/UL
IMM GRANULOCYTES NFR BLD AUTO: 0 %
LACTATE SERPL-SCNC: 1.2 MMOL/L (ref 0.4–2)
LACTATE SERPL-SCNC: 1.4 MMOL/L (ref 0.4–2)
LYMPHOCYTES # BLD: 1.7 K/UL (ref 0.8–3.5)
LYMPHOCYTES NFR BLD: 12 % (ref 12–49)
MAGNESIUM SERPL-MCNC: 2.1 MG/DL (ref 1.6–2.4)
MCH RBC QN AUTO: 30.9 PG (ref 26–34)
MCHC RBC AUTO-ENTMCNC: 33 G/DL (ref 30–36.5)
MCV RBC AUTO: 93.5 FL (ref 80–99)
METAMYELOCYTES NFR BLD MANUAL: 1 %
MONOCYTES # BLD: 0.4 K/UL (ref 0–1)
MONOCYTES NFR BLD: 3 % (ref 5–13)
NEUTS BAND NFR BLD MANUAL: 16 % (ref 0–6)
NEUTS SEG # BLD: 11.6 K/UL (ref 1.8–8)
NEUTS SEG NFR BLD: 68 % (ref 32–75)
NRBC # BLD: 0 K/UL (ref 0–0.01)
NRBC BLD-RTO: 0 PER 100 WBC
PHOSPHATE SERPL-MCNC: 3 MG/DL (ref 2.6–4.7)
PLATELET # BLD AUTO: 150 K/UL (ref 150–400)
PMV BLD AUTO: 10.8 FL (ref 8.9–12.9)
POTASSIUM SERPL-SCNC: 3.5 MMOL/L (ref 3.5–5.1)
RBC # BLD AUTO: 3.37 M/UL (ref 3.8–5.2)
RBC MORPH BLD: ABNORMAL
SODIUM SERPL-SCNC: 144 MMOL/L (ref 136–145)
WBC # BLD AUTO: 13.8 K/UL (ref 3.6–11)
WBC MORPH BLD: ABNORMAL

## 2018-06-16 PROCEDURE — 85025 COMPLETE CBC W/AUTO DIFF WBC: CPT | Performed by: HOSPITALIST

## 2018-06-16 PROCEDURE — 65660000001 HC RM ICU INTERMED STEPDOWN

## 2018-06-16 PROCEDURE — 80048 BASIC METABOLIC PNL TOTAL CA: CPT | Performed by: HOSPITALIST

## 2018-06-16 PROCEDURE — 36415 COLL VENOUS BLD VENIPUNCTURE: CPT | Performed by: FAMILY MEDICINE

## 2018-06-16 PROCEDURE — C9113 INJ PANTOPRAZOLE SODIUM, VIA: HCPCS | Performed by: HOSPITALIST

## 2018-06-16 PROCEDURE — 74011250636 HC RX REV CODE- 250/636: Performed by: FAMILY MEDICINE

## 2018-06-16 PROCEDURE — 74011000250 HC RX REV CODE- 250: Performed by: HOSPITALIST

## 2018-06-16 PROCEDURE — 74011000258 HC RX REV CODE- 258: Performed by: HOSPITALIST

## 2018-06-16 PROCEDURE — 74011250636 HC RX REV CODE- 250/636: Performed by: HOSPITALIST

## 2018-06-16 PROCEDURE — 74011250637 HC RX REV CODE- 250/637: Performed by: NURSE PRACTITIONER

## 2018-06-16 PROCEDURE — 87045 FECES CULTURE AEROBIC BACT: CPT | Performed by: HOSPITALIST

## 2018-06-16 PROCEDURE — 84100 ASSAY OF PHOSPHORUS: CPT | Performed by: HOSPITALIST

## 2018-06-16 PROCEDURE — 83735 ASSAY OF MAGNESIUM: CPT | Performed by: HOSPITALIST

## 2018-06-16 PROCEDURE — 83605 ASSAY OF LACTIC ACID: CPT | Performed by: FAMILY MEDICINE

## 2018-06-16 RX ORDER — ACETAMINOPHEN 325 MG/1
650 TABLET ORAL
Status: DISCONTINUED | OUTPATIENT
Start: 2018-06-16 | End: 2018-06-25

## 2018-06-16 RX ADMIN — ENOXAPARIN SODIUM 40 MG: 100 INJECTION SUBCUTANEOUS at 01:10

## 2018-06-16 RX ADMIN — Medication 10 ML: at 14:00

## 2018-06-16 RX ADMIN — METRONIDAZOLE 500 MG: 500 INJECTION, SOLUTION INTRAVENOUS at 01:10

## 2018-06-16 RX ADMIN — CEFTRIAXONE SODIUM 2 G: 2 INJECTION, POWDER, FOR SOLUTION INTRAMUSCULAR; INTRAVENOUS at 01:10

## 2018-06-16 RX ADMIN — SODIUM CHLORIDE 500 ML: 900 INJECTION, SOLUTION INTRAVENOUS at 05:10

## 2018-06-16 RX ADMIN — ACETAMINOPHEN 650 MG: 325 TABLET ORAL at 18:00

## 2018-06-16 RX ADMIN — METRONIDAZOLE 500 MG: 500 INJECTION, SOLUTION INTRAVENOUS at 17:00

## 2018-06-16 RX ADMIN — SODIUM CHLORIDE 40 MG: 9 INJECTION INTRAMUSCULAR; INTRAVENOUS; SUBCUTANEOUS at 10:42

## 2018-06-16 RX ADMIN — METRONIDAZOLE 500 MG: 500 INJECTION, SOLUTION INTRAVENOUS at 10:42

## 2018-06-16 RX ADMIN — Medication 10 ML: at 06:00

## 2018-06-16 RX ADMIN — SODIUM CHLORIDE 125 ML/HR: 900 INJECTION, SOLUTION INTRAVENOUS at 10:42

## 2018-06-16 NOTE — PROGRESS NOTES
Pt c/o \"hurting all over. \"  Spoke with Sofía Guzman NP, who says that pt may have Tylenol 650 mg po q 6 hrs prn for pain/fever crushed in applesauce, IF, she is able to pass a bedside swallow with liquids. Pt was on a pureed diet prior to admission at [facility].   This nurse will perform bedside swallow and apply order if pt passes swallow eval.

## 2018-06-16 NOTE — ED NOTES
Pt incontinent of a large amount of liquid stool. Sample collected and sent to lab. Nguyen care provided and linens changed.

## 2018-06-16 NOTE — PROGRESS NOTES
2152 - TRANSFER - IN REPORT:    Verbal report received from Farheen(name) on Felecia Lin  being received from ED(unit) for routine progression of care      Report consisted of patients Situation, Background, Assessment and   Recommendations(SBAR). Information from the following report(s) SBAR, Kardex, ED Summary, Procedure Summary, Intake/Output, MAR, Accordion, Recent Results, Med Rec Status and Cardiac Rhythm NSR was reviewed with the receiving nurse. Opportunity for questions and clarification was provided. Assessment completed upon patients arrival to unit and care assumed. 0100 - Primary Nurse Juliana Little and Stanford Mcardle, RN performed a dual skin assessment on this patient Impairment noted- see wound doc flow sheet  Refugio score is 10. Scarring/redness noted on sacrum/buttocks and groin/perineum. Also, red, swollen area noted on left side of neck. Wound care consult placed. 0305 - Pt having liquid stools (awaiting C.diff results) and BP is low, last one 93/36. Dr. Randy Jasso paged, awaiting callback. 3523 - Dr. Randy Jasso returned call. Orders received to place Flexiseal and to give pt 500 cc bolus. Also, orders received to check pt Lactic acid level Q6H. Will continue to monitor. 0340 - 12 lb weight gain noted. Pt weighed in bed on both 6/15 and 6/16. Wt on 6/16 done with bed zeroed, one pillow, sheet, and 1 blanket. Will address with day RN. 0830 - Bedside and Verbal shift change report given to Nereida Brown (oncoming nurse) by Chapin Muir (offgoing nurse). Report included the following information SBAR, Kardex, ED Summary, Procedure Summary, Intake/Output, MAR, Accordion, Recent Results, Med Rec Status and Cardiac Rhythm NSR. Problem: Falls - Risk of  Goal: *Absence of Falls  Document Selam Fall Risk and appropriate interventions in the flowsheet.    Outcome: Progressing Towards Goal  Fall Risk Interventions:     Elimination Interventions: Call light in reach, Toileting schedule/hourly rounds      Pt has had no falls during admission. Call bell within reach. Hourly rounds performed.           Problem: Sepsis: Day of Diagnosis  Goal: *Fluid resuscitation  Outcome: Progressing Towards Goal  Pt received 2L bolus in ED and is getting continuous NS @ 125ml/hr. Goal: *First dose of  appropriate antibiotic within 3 hours of arrival to ED, within 1 hour of arrival to ICU  Outcome: Progressing Towards Goal  Pt received first dose of vancomycin in the ED; pt also on rocephin and flagyl.   Goal: Activity/Safety  Outcome: Progressing Towards Goal  Pt on bedrest.

## 2018-06-16 NOTE — H&P
[address]  HISTORY AND PHYSICAL      Name:DEBRA BRUNO  MR#: [id number]  : 1940  ACCOUNT #: [de-identified]   ADMIT DATE: 06/15/2018    CHIEF COMPLAINT:  Fever. HISTORY OF PRESENT ILLNESS:  The patient is unable to provide any history. History is from ED report and the patient's family member. HISTORY OF PRESENT ILLNESS:  This is a 68-year-old [facility] [employer] female with history of brain injury per report and also with functional quadriplegia, chronic extremity contraction, bedbound. The patient only communicates with simple words. She has a chronic Schafer and she is a long-term resident in the nursing home, over 10 years possibly. The patient was found to have temperature of 104 this morning and was given Tylenol to bring it down to 100.3 then  she also noticed her urine was very cloud. Yesterday she vomited once per nurse report. Her appetite also decreased. The patient was sent to ED. Noticed to be hypotensive, blood pressure systolic the lowest one is 67/34. She was given 2 L bolus which now systolic blood pressure is around 100. The patient is lethargic with minimal response and nonverbal for now. Other review of systems unable to obtain. Noticed the patient had a large volume of liquid stool passed in the ED    PAST MEDICAL HISTORY:  Brain injury, functional quadriplegia, chronic extremity contractions, bed bound, hypertension. MEDICATIONS:  Atropine ophthalmologic solution, gabapentin 100 mg 3 times a day, hydrochlorothiazide 12.5 mg daily, Albuterol DuoNeb, lisinopril 2.5 mg daily, Imodium 4 times a day as needed, Percocet 5/325 every 8 hours, Protonix 40 mg daily, Senokot, Flomax 0.4 mg daily, vitamin B6. ALLERGIES:  NO KNOWN ALLERGIES. The patient is DNI/DNR. SOCIAL HISTORY:  Unable to obtain,unknown. FAMILY HISTORY:  Also unable to obtain. LABORATORY DATA:  Labs on admission:  WBC 14, H and H 12/38, platelets 640.   UA shows a large amount of leukocyte, 20-50 of WBC. BUN 45, creatinine 1.6. Lactic acid 2.9. Liver function is normal.  Chest x-ray: No under expansion of the lung. Blood culture is pending. PHYSICAL EXAMINATION:  GENERAL:  Patient is minimally responsive and nonverbal, holding her contracted extremities. VITAL SIGNS:  Temperature 103.3 initially, now it is 98.8, blood pressure is now 96/48, respiration rate 14, sats 98 on room air. HEENT:  Dry mucous, poor dental hygiene. No nose discharge. Pupils reactive. NECK:  No JVD, no carotic bruits. LYMPHATIC: Lymph nodes not palpable. CHEST:  Clear to auscultation. No wheezing. No mass palpable. No tenderness on palpation. HEART:  Regular rate. No murmur. ABDOMEN:  Mildly distended. No significant tenderness. Bowel sounds are active and a Schafer is noted. Urine is mildly cloudy. EXTREMITIES:  Contracted. SKIN:  Dry and warm. No edema. NEUROLOGIC:  Not able to assess. PSYCHIATRIC:  Not able to assess. ASSESSMENT:  1. Severe sepsis:  Possible source either is UTI versus GI source. Noticed has pyuria f but also she has watery liquid stool. The patient will continue IV fluid resuscitation. Will admit to [facility]. We will follow lactic acid until normal.  I will start her on IV Rocephin and Flagyl for now to cover C. diff and a urinary tract infection both. We will follow the culture result. 2. Catheter associate UTI: plan as above   3. Diarrhea: follow stool c diff and culture. 4.  Brain injury with bedbound, functional quadriplegia:  Supportive care, reposition patient. 5.  History of hypertension now:  Hold all her BP meds. 6.  We will hold all her pain meds and we will reassess the patient. 7.  The patient's POA is Elian Frausto, her nephew. The patient is DNI/DNR. Will be n.p.o. for now and may reassess her oral speech and swallow function when her mental status is better.       MD MICHELLE Hernandez/  D: 06/15/2018 21:11     T: 06/15/2018 23:15  JOB #: [id number]

## 2018-06-16 NOTE — PROGRESS NOTES
1 Patients meets criteria for fecal management system. Reviewed with primary nurse Marlys Bean RN and chart prior to insertion    No contraindications, warnings, or precautions exist to preclude placement such as:      a. Suspected or confirmed rectal mucosal impairment (e.g. severe proctitis, ischemic proctitis, ulcerations)  b. Rectal surgery within the last year  c. Rectal or anal injury  d. Hemorrhoids of significant size and/or symptoms  e. Rectal or anal stricture or stenosis  f. Suspected or confirmed rectal/anal tumor  g. In-dwelling rectal,anal device or delivery mechanism (e.g. suppositories, temperature probes or enemas) in place.  h. Sensitivity to or who have had an allergic reaction to any component within the system  i. Pediatric patients (under 25years old)      2. MD order present in [facility] care. NO-history of inflammatory bowel disease (i.e. Crohns, Ulcerative Colitis, severe rectal or anal stricture or stenosis)       3. The fecal management system will not be used longer than a cumulative total of 29 days    4. Patient tolerated well. Treated skin with zinc ointment and obtained stool sample as ordered. 5. Policy for maintenance left at bedside along with verbal instructions and demonstration.

## 2018-06-16 NOTE — ED NOTES
TRANSFER - OUT REPORT:    Verbal report given to Viri(name) on Abiel Men  being transferred to [facility]) for routine progression of care       Report consisted of patients Situation, Background, Assessment and   Recommendations(SBAR). Information from the following report(s) SBAR, ED Summary and Cardiac Rhythm NSR was reviewed with the receiving nurse. Lines:   Peripheral IV 06/15/18 Left Hand (Active)   Site Assessment Clean, dry, & intact 6/15/2018  7:18 PM   Phlebitis Assessment 0 6/15/2018  7:18 PM   Infiltration Assessment 0 6/15/2018  7:18 PM   Dressing Status Clean, dry, & intact 6/15/2018  7:18 PM       Peripheral IV 06/15/18 Right Hand (Active)   Site Assessment Clean, dry, & intact 6/15/2018  7:18 PM   Phlebitis Assessment 0 6/15/2018  7:18 PM   Infiltration Assessment 0 6/15/2018  7:18 PM   Dressing Status Clean, dry, & intact 6/15/2018  7:18 PM        Opportunity for questions and clarification was provided.       Patient transported with:   Monitor  Registered Nurse

## 2018-06-16 NOTE — PROGRESS NOTES
Hospitalist Progress Note  Dnay Huddleston NP  Answering service: [phone] from in house phone  Cell: [phone]      Date of Service:  2018  NAME:  Beckie Childs  :  1940  MRN:  [id number]    Admission Summary:   Pt presented to the ED with fever. The patient was found to have temperature of 104 this morning at the [facility] facility - she was given tylenol which brought down her temp to 100.3. She also had an episode of vomiting/poor appetite and her urine was darker than usual.  EMS indicated that staff at [address] [place] [address] was concerned pt aspirated. Interval history / Subjective:      Pt in bed, alert and responsive to questions. Slow, has to be given time to respond (which is her baseline). Reports some LLQ discomfort. Transfer MAR not available in media section or bedside chart for review, however, pharmacist notes   Assessment & Plan:     Severe sepsis (hypotension, fever, lactic acidosis, leukocytosis with sources wither GI or UTI) (POA):    - + pyuria. UA was turbid with + large leuks, WBC 20-50 and 4+ bacteria. Culture pending.  - + watery liquid stool - stool for c diff pending  - continue with  IV fluid resuscitation that was started in ED  - lactic acidosis resolved   - continue IV Rocephin and Flagyl for now to cover C. diff and UTI    Dehydration with elevated Creatinine:  - continue with fluids, recheck labs in am  - unknown baseline Cr    Hx Brain injury with functional quadriplegia:    - Bed bound  - Supportive care, reposition patient. - communicates with simple words    Hx of hypertension: Holding all her usual BP meds.   - BP improved    Chronic Schafer Catheter  - ED changed catheter per nursing report    Mild Hyperkalemia: on IVF, recheck labs in am    Mild Hyponatremia: on IVF, recheck labs in am    Code status: DNR  DVT prophylaxis: Kanu 56 discussed with: delmar nurse, pts nephew  Disposition: pt in long term care    POA is Mark Jack, her nephew: [phone]. Spoke with him about pt plan of care while he was visiting today. Call for update. Hospital Problems  Never Reviewed          Codes Class Noted POA    * (Principal)Sepsis [facility]) ICD-10-CM: A41.9  ICD-9-CM: 038.9, 995.91  6/15/2018 Yes        Catheter-associated urinary tract infection ([facility] ICD-10-CM: T83.511A, N39.0  ICD-9-CM: 996.64, 599.0  6/15/2018 Yes        Diarrhea ICD-10-CM: R19.7  ICD-9-CM: 787.91  6/15/2018 Yes        Functional quadriplegia ([facility] ICD-10-CM: R53.2  ICD-9-CM: 780.72  6/15/2018 Yes            Review of Systems:   Answers in limited words. Says she has no significant complaints - has some LLQ pain. Vital Signs:    Last 24hrs VS reviewed since prior progress note. Most recent are:  Visit Vitals    /44 (BP 1 Location: Right leg, BP Patient Position: At rest)    Pulse 80    Temp 98.8 °F (37.1 °C)    Resp 18    Ht 5' 2\" (1.575 m)    Wt 48.2 kg (106 lb 3.2 oz)    SpO2 100%    BMI 19.42 kg/m2       Intake/Output Summary (Last 24 hours) at 06/16/18 1034  Last data filed at 06/16/18 0454   Gross per 24 hour   Intake                0 ml   Output              200 ml   Net             -200 ml      Physical Examination:         Constitutional:  No acute distress, cooperative, pleasant    ENT:  Oral mucous membranes moist. Lower denture looks dirty. Pt has a protruding lower jaw and lip. Resp:  CTA bilaterally. No wheezing. No accessory muscle use and on RA   CV:  Regular rhythm, normal rate, no murmurs. SR on tele. GI:  Soft, non distended, tender to LLQ with deep palaption. normoactive bowel sounds. + Flexiseal in place with liquid/watery green-gray stool. Musculoskeletal:  No edema, warm, 2+ pulses throughout    Neurologic:  Moves all extremities.   AAOx2       Data Review:   Review and/or order of clinical lab test  Review and/or order of tests in the radiology section of CPT  Review and/or order of tests in the medicine section of CPT    Labs:     Recent Labs      06/15/18   1836   WBC  14.3*   HGB  12.8   HCT  38.1   PLT  193     Recent Labs      06/15/18   1836   NA  135*   K  5.3*   CL  103   CO2  20*   BUN  45*   CREA  1.76*   GLU  164*   CA  9.0     Recent Labs      06/15/18   1836   SGOT  44*   ALT  18   AP  78   TBILI  0.6   TP  7.4   ALB  2.8*   GLOB  4.6*     No results for input(s): INR, PTP, APTT in the last 72 hours. No lab exists for component: INREXT   No results for input(s): FE, TIBC, PSAT, FERR in the last 72 hours. No results found for: FOL, RBCF   No results for input(s): PH, PCO2, PO2 in the last 72 hours. No results for input(s): CPK, CKNDX, TROIQ in the last 72 hours.     No lab exists for component: CPKMB  No results found for: CHOL, CHOLX, CHLST, CHOLV, HDL, LDL, LDLC, DLDLP, TGLX, TRIGL, TRIGP, CHHD, CHHDX  No results found for: [facility]  Lab Results   Component Value Date/Time    Color DARK YELLOW 06/15/2018 07:48 PM    Appearance TURBID (A) 06/15/2018 07:48 PM    Specific gravity 1.024 06/15/2018 07:48 PM    pH (UA) 5.0 06/15/2018 07:48 PM    Protein 30 (A) 06/15/2018 07:48 PM    Glucose NEGATIVE  06/15/2018 07:48 PM    Ketone TRACE (A) 06/15/2018 07:48 PM    Urobilinogen 1.0 06/15/2018 07:48 PM    Nitrites NEGATIVE  06/15/2018 07:48 PM    Leukocyte Esterase LARGE (A) 06/15/2018 07:48 PM    Epithelial cells MODERATE (A) 06/15/2018 07:48 PM    Bacteria 4+ (A) 06/15/2018 07:48 PM    WBC 20-50 06/15/2018 07:48 PM    RBC 0-5 06/15/2018 07:48 PM     Medications Reviewed:     Current Facility-Administered Medications   Medication Dose Route Frequency    0.9% sodium chloride infusion  125 mL/hr IntraVENous CONTINUOUS    albuterol-ipratropium (DUO-NEB) 2.5 MG-0.5 MG/3 ML  3 mL Nebulization Q4H PRN    pantoprazole (PROTONIX) 40 mg in sodium chloride 0.9% 10 mL injection  40 mg IntraVENous DAILY    cefTRIAXone (ROCEPHIN) 2 g in 0.9% sodium chloride (MBP/ADV) 50 mL  2 g IntraVENous Q24H    metroNIDAZOLE (FLAGYL) IVPB premix 500 mg  500 mg IntraVENous Q8H    sodium chloride (NS) flush 5-10 mL  5-10 mL IntraVENous Q8H    sodium chloride (NS) flush 5-10 mL  5-10 mL IntraVENous PRN    naloxone (NARCAN) injection 0.4 mg  0.4 mg IntraVENous PRN    enoxaparin (LOVENOX) injection 40 mg  40 mg SubCUTAneous Q24H    ondansetron (ZOFRAN) injection 4 mg  4 mg IntraVENous Q4H PRN   ______________________________________________________________________  EXPECTED LENGTH OF STAY: - - -  ACTUAL LENGTH OF STAY:          1               Elsie Huang NP

## 2018-06-16 NOTE — PROGRESS NOTES
Admission Medication Reconciliation:    Information obtained from: [facility] and Rehab transfer paperwork    Significant PMH/Disease States:   Past Medical History:   Diagnosis Date    Diabetes ([address]     Gastrointestinal disorder     intestinal obstruction, gastro-esophageal reflux    Hypertension     Ill-defined condition     demyelinating disease of central nervous system    Ill-defined condition     dysphagia, oropharyngeal phase    Ill-defined condition     neuromuscular dysfunction of bladder, retention of urine       Chief Complaint for this Admission:  Fever    Allergies:  Review of patient's allergies indicates no known allergies. Prior to Admission Medications:   Prior to Admission Medications   Prescriptions Last Dose Informant Patient Reported? Taking? PSEUDOEPHEDRINE-dextromethorphan-guaiFENesin (ROBAFEN CF) 30- mg/5 mL solution   Yes Yes   Sig: Take 5 mL by mouth every four (4) hours as needed for Cough. acetaminophen (TYLENOL) 325 mg tablet   Yes Yes   Sig: Take 650 mg by mouth every four (4) hours as needed for Pain (Do not exceed 3 g /24 hours). acetic acid 0.25 % irrigation   Yes Yes   Sig: by Irrigation route daily. Mix 30 mL of acetic acid 0.25% with 60 mL of water and use to irrigate mosquera catheter 3x a week on    albuterol-ipratropium (DUO-NEB) 2.5 mg-0.5 mg/3 ml nebu   Yes Yes   Sig: 3 mL by Nebulization route every four (4) hours as needed. atropine 1 % ophthalmic solution   Yes Yes   Si-2 Drops by SubLINGual route three (3) times daily. Scheduled before meals-for secretions   cyanocobalamin/folic ac/vit B6 (VIRT-GURWINDER PO)   Yes Yes   Sig: Take 1 Tab by mouth daily. Supplement   gabapentin (NEURONTIN) 100 mg capsule   Yes Yes   Sig: Take 100 mg by mouth three (3) times daily. hydroCHLOROthiazide (HYDRODIURIL) 12.5 mg tablet   Yes Yes   Sig: Take 12.5 mg by mouth daily.    lisinopril (PRINIVIL, ZESTRIL) 2.5 mg tablet   Yes Yes   Sig: Take 2.5 mg by mouth daily.   loperamide (IMODIUM) 2 mg capsule   Yes Yes   Sig: Take  by mouth four (4) times daily as needed for Diarrhea.   loratadine (CLARITIN) 10 mg tablet   Yes Yes   Sig: Take 10 mg by mouth daily as needed for Allergies. oxyCODONE-acetaminophen (PERCOCET) 5-325 mg per tablet   Yes Yes   Sig: Take 2 Tabs by mouth every eight (8) hours. Transfer paperwork shows scheduled   pantoprazole (PROTONIX) 40 mg tablet   Yes Yes   Sig: Take 40 mg by mouth daily. potassium chloride (K-DUR, KLOR-CON) 20 mEq tablet   Yes Yes   Sig: Take 20 mEq by mouth daily. promethazine (PHENERGAN) 25 mg tablet   Yes Yes   Sig: Take 25 mg by mouth every four (4) hours as needed for Nausea. senna (SENEXON) 8.6 mg tablet   Yes Yes   Sig: Take 1 Tab by mouth two (2) times a day. sodium chloride (DEEP SEA NASAL NA)   Yes Yes   Si Spray by Nasal route every two (2) hours as needed for Other (congestion). tamsulosin (FLOMAX) 0.4 mg capsule   Yes Yes   Sig: Take 0.4 mg by mouth nightly. Indications: urinary retention   therapeutic multivitamin (THERAGRAN) tablet   Yes Yes   Sig: Take 1 Tab by mouth daily. Facility-Administered Medications: None         Comments/Recommendations: Patient did not participate in interview (sleeping each time I stopped in). Medication list compiled entirely from transfer paperwork. Added all agents, unable to confirm allergies or update administration times (no answer at facility x 3 attempts). Thank you for allowing me to participate in the care of your patient.     Little Bennett PharmD, RN #[id number]

## 2018-06-16 NOTE — PROGRESS NOTES
Pt was able to swallow thin water with head of bed at 90 degrees. No coughing or difficulty breathing during swallowing. Oxygen saturation is 100% on room air. Informed pt that Tylenol will be available to her as needed for pain and this nurse will administer medication to her now.

## 2018-06-17 LAB
ANION GAP SERPL CALC-SCNC: 12 MMOL/L (ref 5–15)
BUN SERPL-MCNC: 35 MG/DL (ref 6–20)
BUN/CREAT SERPL: 45 (ref 12–20)
CALCIUM SERPL-MCNC: 8.5 MG/DL (ref 8.5–10.1)
CHLORIDE SERPL-SCNC: 115 MMOL/L (ref 97–108)
CO2 SERPL-SCNC: 19 MMOL/L (ref 21–32)
CREAT SERPL-MCNC: 0.78 MG/DL (ref 0.55–1.02)
ERYTHROCYTE [DISTWIDTH] IN BLOOD BY AUTOMATED COUNT: 14.9 % (ref 11.5–14.5)
GLUCOSE BLD STRIP.AUTO-MCNC: 105 MG/DL (ref 65–100)
GLUCOSE BLD STRIP.AUTO-MCNC: 126 MG/DL (ref 65–100)
GLUCOSE BLD STRIP.AUTO-MCNC: 97 MG/DL (ref 65–100)
GLUCOSE SERPL-MCNC: 92 MG/DL (ref 65–100)
HCT VFR BLD AUTO: 32.2 % (ref 35–47)
HGB BLD-MCNC: 10.4 G/DL (ref 11.5–16)
MCH RBC QN AUTO: 30.8 PG (ref 26–34)
MCHC RBC AUTO-ENTMCNC: 32.3 G/DL (ref 30–36.5)
MCV RBC AUTO: 95.3 FL (ref 80–99)
NRBC # BLD: 0 K/UL (ref 0–0.01)
NRBC BLD-RTO: 0 PER 100 WBC
PLATELET # BLD AUTO: 142 K/UL (ref 150–400)
PMV BLD AUTO: 10.8 FL (ref 8.9–12.9)
POTASSIUM SERPL-SCNC: 3 MMOL/L (ref 3.5–5.1)
RBC # BLD AUTO: 3.38 M/UL (ref 3.8–5.2)
SERVICE CMNT-IMP: ABNORMAL
SERVICE CMNT-IMP: ABNORMAL
SERVICE CMNT-IMP: NORMAL
SODIUM SERPL-SCNC: 146 MMOL/L (ref 136–145)
WBC # BLD AUTO: 13.9 K/UL (ref 3.6–11)

## 2018-06-17 PROCEDURE — 74011250636 HC RX REV CODE- 250/636: Performed by: NURSE PRACTITIONER

## 2018-06-17 PROCEDURE — 74011000250 HC RX REV CODE- 250: Performed by: HOSPITALIST

## 2018-06-17 PROCEDURE — 74011250636 HC RX REV CODE- 250/636: Performed by: HOSPITALIST

## 2018-06-17 PROCEDURE — 74011250637 HC RX REV CODE- 250/637: Performed by: NURSE PRACTITIONER

## 2018-06-17 PROCEDURE — 65270000029 HC RM PRIVATE

## 2018-06-17 PROCEDURE — 80048 BASIC METABOLIC PNL TOTAL CA: CPT | Performed by: NURSE PRACTITIONER

## 2018-06-17 PROCEDURE — 36415 COLL VENOUS BLD VENIPUNCTURE: CPT | Performed by: NURSE PRACTITIONER

## 2018-06-17 PROCEDURE — 82962 GLUCOSE BLOOD TEST: CPT

## 2018-06-17 PROCEDURE — 85027 COMPLETE CBC AUTOMATED: CPT | Performed by: NURSE PRACTITIONER

## 2018-06-17 PROCEDURE — 74011000258 HC RX REV CODE- 258: Performed by: HOSPITALIST

## 2018-06-17 PROCEDURE — C9113 INJ PANTOPRAZOLE SODIUM, VIA: HCPCS | Performed by: HOSPITALIST

## 2018-06-17 RX ORDER — DEXTROSE, SODIUM CHLORIDE, AND POTASSIUM CHLORIDE 5; .45; .15 G/100ML; G/100ML; G/100ML
50 INJECTION INTRAVENOUS CONTINUOUS
Status: DISCONTINUED | OUTPATIENT
Start: 2018-06-17 | End: 2018-06-18

## 2018-06-17 RX ORDER — POTASSIUM CHLORIDE 7.45 MG/ML
10 INJECTION INTRAVENOUS
Status: DISCONTINUED | OUTPATIENT
Start: 2018-06-17 | End: 2018-06-17

## 2018-06-17 RX ORDER — POTASSIUM CHLORIDE 20MEQ/15ML
20 LIQUID (ML) ORAL DAILY
Status: DISCONTINUED | OUTPATIENT
Start: 2018-06-17 | End: 2018-06-19

## 2018-06-17 RX ADMIN — CEFTRIAXONE SODIUM 2 G: 2 INJECTION, POWDER, FOR SOLUTION INTRAMUSCULAR; INTRAVENOUS at 22:00

## 2018-06-17 RX ADMIN — Medication 10 ML: at 22:00

## 2018-06-17 RX ADMIN — CEFTRIAXONE SODIUM 2 G: 2 INJECTION, POWDER, FOR SOLUTION INTRAMUSCULAR; INTRAVENOUS at 04:05

## 2018-06-17 RX ADMIN — METRONIDAZOLE 500 MG: 500 INJECTION, SOLUTION INTRAVENOUS at 04:06

## 2018-06-17 RX ADMIN — Medication 10 ML: at 13:29

## 2018-06-17 RX ADMIN — POTASSIUM CHLORIDE 20 MEQ: 40 SOLUTION ORAL at 11:22

## 2018-06-17 RX ADMIN — ACETAMINOPHEN 650 MG: 325 TABLET ORAL at 22:07

## 2018-06-17 RX ADMIN — ENOXAPARIN SODIUM 40 MG: 100 INJECTION SUBCUTANEOUS at 02:35

## 2018-06-17 RX ADMIN — DEXTROSE MONOHYDRATE, SODIUM CHLORIDE, AND POTASSIUM CHLORIDE 50 ML/HR: 50; 4.5; 1.49 INJECTION, SOLUTION INTRAVENOUS at 11:22

## 2018-06-17 RX ADMIN — ACETAMINOPHEN 650 MG: 325 TABLET ORAL at 16:42

## 2018-06-17 RX ADMIN — SODIUM CHLORIDE 125 ML/HR: 900 INJECTION, SOLUTION INTRAVENOUS at 02:38

## 2018-06-17 RX ADMIN — ACETAMINOPHEN 650 MG: 325 TABLET ORAL at 03:02

## 2018-06-17 RX ADMIN — SODIUM CHLORIDE 40 MG: 9 INJECTION INTRAMUSCULAR; INTRAVENOUS; SUBCUTANEOUS at 08:24

## 2018-06-17 RX ADMIN — ACETAMINOPHEN 650 MG: 325 TABLET ORAL at 11:21

## 2018-06-17 RX ADMIN — ENOXAPARIN SODIUM 40 MG: 100 INJECTION SUBCUTANEOUS at 22:00

## 2018-06-17 RX ADMIN — Medication 10 ML: at 02:36

## 2018-06-17 RX ADMIN — Medication 10 ML: at 08:25

## 2018-06-17 NOTE — PROGRESS NOTES
Bedside shift change report given to Lora Lujan RN (oncoming nurse) by Luciana Donald RN (offgoing nurse). Report included the following information SBAR, Intake/Output, MAR, Recent Results and Cardiac Rhythm NSR.

## 2018-06-17 NOTE — PROGRESS NOTES
Bedside and Verbal shift change report given to Toño Mckeon (oncoming nurse) by Jon Roberts (offgoing nurse). Report included the following information SBAR.

## 2018-06-17 NOTE — PROGRESS NOTES
Problem: Falls - Risk of  Goal: *Absence of Falls  Document Selam Fall Risk and appropriate interventions in the flowsheet. Outcome: Progressing Towards Goal  Fall Risk Interventions:       Mentation Interventions: Adequate sleep, hydration, pain control, Bed/chair exit alarm, Door open when patient unattended, Room close to nurse's station, Reorient patient, Toileting rounds         Elimination Interventions: Bed/chair exit alarm, Call light in reach, Toileting schedule/hourly rounds, Patient to call for help with toileting needs             Problem: Pressure Injury - Risk of  Goal: *Prevention of pressure injury  Document Refugio Scale and appropriate interventions in the flowsheet. Outcome: Progressing Towards Goal  Pressure Injury Interventions:  Sensory Interventions: Assess changes in LOC, Float heels, Monitor skin under medical devices, Pressure redistribution bed/mattress (bed type), Keep linens dry and wrinkle-free, Minimize linen layers, Turn and reposition approx. every two hours (pillows and wedges if needed)    Moisture Interventions: Absorbent underpads, Minimize layers, Moisture barrier, Apply protective barrier, creams and emollients, Maintain skin hydration (lotion/cream)    Activity Interventions: PT/OT evaluation, Pressure redistribution bed/mattress(bed type)    Mobility Interventions: Assess need for specialty bed, Float heels, PT/OT evaluation, Turn and reposition approx.  every two hours(pillow and wedges), Pressure redistribution bed/mattress (bed type)    Nutrition Interventions: Document food/fluid/supplement intake, Discuss nutritional consult with provider    Friction and Shear Interventions: Apply protective barrier, creams and emollients, Feet elevated on foot rest, Lift sheet, Lift team/patient mobility team, Minimize layers

## 2018-06-17 NOTE — PROGRESS NOTES
Primary Nurse Angelica Painting and JESSE Hernandez RN performed a dual skin assessment on this patient No impairment noted, Fecal Management system intact, healed sacral impairment noted, no open skin areas  Refugio score is 12

## 2018-06-17 NOTE — PROGRESS NOTES
TRANSFER - OUT REPORT:    Verbal report given to EMIR Pablo(name) on Winferd Part  being transferred to (unit) for routine progression of care       Report consisted of patients Situation, Background, Assessment and   Recommendations(SBAR). Information from the following report(s) SBAR, Kardex, ED Summary, Procedure Summary, Intake/Output, MAR, Recent Results and Cardiac Rhythm NSR\ was reviewed with the receiving nurse. Lines:   Peripheral IV 06/15/18 Left Hand (Active)   Site Assessment Clean, dry, & intact 6/17/2018  4:00 AM   Phlebitis Assessment 0 6/17/2018  4:00 AM   Infiltration Assessment 0 6/17/2018  4:00 AM   Dressing Status Clean, dry, & intact 6/17/2018  4:00 AM   Dressing Type Tape;Transparent 6/17/2018  4:00 AM   Hub Color/Line Status Pink 6/17/2018  4:00 AM   Action Taken Open ports on tubing capped 6/17/2018  4:00 AM   Alcohol Cap Used Yes 6/17/2018  4:00 AM       Peripheral IV 06/15/18 Right Hand (Active)   Site Assessment Clean, dry, & intact 6/17/2018  4:00 AM   Phlebitis Assessment 0 6/17/2018  4:00 AM   Infiltration Assessment 0 6/17/2018  4:00 AM   Dressing Status Clean, dry, & intact 6/17/2018  4:00 AM   Dressing Type Tape;Transparent 6/17/2018  4:00 AM   Hub Color/Line Status Pink 6/17/2018  4:00 AM   Action Taken Open ports on tubing capped 6/17/2018  4:00 AM   Alcohol Cap Used Yes 6/17/2018  4:00 AM        Opportunity for questions and clarification was provided. Patient transported with:   patient belongings, IV pump        1147-Call placed to [address] [place] [address] and notified on room and level of care change.

## 2018-06-17 NOTE — PROGRESS NOTES
Hospitalist Progress Note  Luis Marquez NP  Answering service: [phone] from in house phone  Cell: [phone]      Date of Service:  2018  NAME:  Tierra Vázquez  :  1940  MRN:  [id number]    Admission Summary:   Pt presented to the ED with fever. The patient was found to have temperature of 104 this morning at the [facility] facility - she was given tylenol which brought down her temp to 100.3. She also had an episode of vomiting/poor appetite and her urine was darker than usual.  EMS indicated that staff at [address] [place] [address] was concerned pt aspirated. Interval history / Subjective:      Alert this morning and complaining of pain all over and asking for Tylenol. Passed swallow evaluation yesterday so advancing diet to pureed. No events overnight per nursing and c-diff is negative. Awaiting urine cultures. Transfer to medical bed. Transfer MAR not available in media section or bedside chart for review, however, pharmacist notes   Assessment & Plan:     Severe sepsis (hypotension, fever, lactic acidosis, leukocytosis with sources wither GI or UTI) (POA):    - + pyuria. UA was turbid with + large leuks, WBC 20-50 and 4+ bacteria. Culture pending.  - + watery liquid stool - c-diff NEGATIVE -> Flagyl stopped ()  - lactic acidosis resolved   - continue IV Rocephin for UTI    Dehydration with elevated Creatinine:  - Cr now normal so will decrease IVF    Hx Brain injury with functional quadriplegia:    - Bed bound  - Supportive care, reposition patient. - communicates with simple words    Hx of hypertension: Holding all her usual BP meds. - BP improved    Chronic Schafer Catheter  - ED changed catheter per nursing report    Mild Hyperkalemia: Replace PO and add to IVF    Code status: DNR  DVT prophylaxis: Lovenox   Care Plan discussed with: delmar, nurse, attending  Disposition: pt in long term care    POA is Harris Celaya, her nephew:  . Hospital Problems  Never Reviewed          Codes Class Noted POA    * (Principal)Sepsis [facility]) ICD-10-CM: A41.9  ICD-9-CM: 038.9, 995.91  6/15/2018 Yes        Catheter-associated urinary tract infection ([facility] ICD-10-CM: T83.511A, N39.0  ICD-9-CM: 996.64, 599.0  6/15/2018 Yes        Diarrhea ICD-10-CM: R19.7  ICD-9-CM: 787.91  6/15/2018 Yes        Functional quadriplegia ([facility] ICD-10-CM: R53.2  ICD-9-CM: 780.72  6/15/2018 Yes            Review of Systems:   Alert. C/o generalized pain. No other complaints and difficult to obtain more than 1 word answer    Vital Signs:    Last 24hrs VS reviewed since prior progress note. Most recent are:  Visit Vitals    /74 (BP 1 Location: Right leg, BP Patient Position: At rest)    Pulse 83    Temp 98.1 °F (36.7 °C)    Resp 16    Ht 5' 2\" (1.575 m)    Wt 49.2 kg (108 lb 7.5 oz)    SpO2 100%    BMI 19.84 kg/m2       Intake/Output Summary (Last 24 hours) at 06/17/18 1023  Last data filed at 06/17/18 0800   Gross per 24 hour   Intake                0 ml   Output              650 ml   Net             -650 ml      Physical Examination:         Constitutional:  No acute distress, cooperative, pleasant    ENT:  Oral mucous membranes moist. Lower denture looks dirty. Pt has a protruding lower jaw and lip. Resp:  CTA bilaterally. No wheezing. No accessory muscle use and on RA   CV:  Regular rhythm, normal rate, no murmurs. SR on tele. GI:  Soft, non distended, tender to LLQ with deep palaption. normoactive bowel sounds. + Flexiseal in place with liquid/watery green-gray stool. Musculoskeletal:  No edema, warm, 2+ pulses throughout    Neurologic:  Moves all extremities.   AAOx2       Data Review:   Review and/or order of clinical lab test  Review and/or order of tests in the radiology section of CPT  Review and/or order of tests in the medicine section of CPT    Labs:     Recent Labs      06/17/18   0255  06/16/18   1015   WBC  13.9*  13.8*   HGB  10.4*  10.4* HCT  32.2*  31.5*   PLT  142*  150     Recent Labs      06/17/18   0255  06/16/18   1015  06/15/18   1836   NA  146*  144  135*   K  3.0*  3.5  5.3*   CL  115*  113*  103   CO2  19*  22  20*   BUN  35*  39*  45*   CREA  0.78  1.06*  1.76*   GLU  92  93  164*   CA  8.5  8.0*  9.0   MG   --   2.1   --    PHOS   --   3.0   --      Recent Labs      06/15/18   1836   SGOT  44*   ALT  18   AP  78   TBILI  0.6   TP  7.4   ALB  2.8*   GLOB  4.6*     No results for input(s): INR, PTP, APTT in the last 72 hours. No lab exists for component: INREXT, INREXT   No results for input(s): FE, TIBC, PSAT, FERR in the last 72 hours. No results found for: FOL, RBCF   No results for input(s): PH, PCO2, PO2 in the last 72 hours. No results for input(s): CPK, CKNDX, TROIQ in the last 72 hours.     No lab exists for component: CPKMB  No results found for: CHOL, CHOLX, CHLST, CHOLV, HDL, LDL, LDLC, DLDLP, TGLX, TRIGL, TRIGP, CHHD, CHHDX  No results found for: [facility]  Lab Results   Component Value Date/Time    Color DARK YELLOW 06/15/2018 07:48 PM    Appearance TURBID (A) 06/15/2018 07:48 PM    Specific gravity 1.024 06/15/2018 07:48 PM    pH (UA) 5.0 06/15/2018 07:48 PM    Protein 30 (A) 06/15/2018 07:48 PM    Glucose NEGATIVE  06/15/2018 07:48 PM    Ketone TRACE (A) 06/15/2018 07:48 PM    Urobilinogen 1.0 06/15/2018 07:48 PM    Nitrites NEGATIVE  06/15/2018 07:48 PM    Leukocyte Esterase LARGE (A) 06/15/2018 07:48 PM    Epithelial cells MODERATE (A) 06/15/2018 07:48 PM    Bacteria 4+ (A) 06/15/2018 07:48 PM    WBC 20-50 06/15/2018 07:48 PM    RBC 0-5 06/15/2018 07:48 PM     Medications Reviewed:     Current Facility-Administered Medications   Medication Dose Route Frequency    dextrose 5% - 0.45% NaCl with KCl 20 mEq/L infusion  50 mL/hr IntraVENous CONTINUOUS    potassium chloride (KAON 10%) 20 mEq/15 mL oral liquid 20 mEq  20 mEq Oral DAILY    acetaminophen (TYLENOL) tablet 650 mg  650 mg Oral Q6H PRN    albuterol-ipratropium (DUO-NEB) 2.5 MG-0.5 MG/3 ML  3 mL Nebulization Q4H PRN    pantoprazole (PROTONIX) 40 mg in sodium chloride 0.9% 10 mL injection  40 mg IntraVENous DAILY    cefTRIAXone (ROCEPHIN) 2 g in 0.9% sodium chloride (MBP/ADV) 50 mL  2 g IntraVENous Q24H    sodium chloride (NS) flush 5-10 mL  5-10 mL IntraVENous Q8H    sodium chloride (NS) flush 5-10 mL  5-10 mL IntraVENous PRN    naloxone (NARCAN) injection 0.4 mg  0.4 mg IntraVENous PRN    enoxaparin (LOVENOX) injection 40 mg  40 mg SubCUTAneous Q24H    ondansetron (ZOFRAN) injection 4 mg  4 mg IntraVENous Q4H PRN   ______________________________________________________________________  EXPECTED LENGTH OF STAY: - - -  ACTUAL LENGTH OF STAY:          2               Elissa Shah NP

## 2018-06-17 NOTE — PROGRESS NOTES
0400 - 2 lb weight gain noted. 0830 - Bedside and Verbal shift change report given to Umair (oncoming nurse) by Heidi Cardoza (offgoing nurse). Report included the following information SBAR, Kardex, ED Summary, Procedure Summary, Intake/Output, MAR, Accordion, Recent Results, Med Rec Status and Cardiac Rhythm NSR/SB. Problem: Falls - Risk of  Goal: *Absence of Falls  Document Selam Fall Risk and appropriate interventions in the flowsheet.    Outcome: Progressing Towards Goal  Fall Risk Interventions:     Mentation Interventions: Adequate sleep, hydration, pain control, Door open when patient unattended, Evaluate medications/consider consulting pharmacy, More frequent rounding, Reorient patient, Room close to nurse's station, Update white board     Medication Interventions: Evaluate medications/consider consulting pharmacy     Elimination Interventions: Call light in reach, Patient to call for help with toileting needs, Toileting schedule/hourly rounds

## 2018-06-18 LAB
ANION GAP SERPL CALC-SCNC: 11 MMOL/L (ref 5–15)
ATRIAL RATE: 89 BPM
BACTERIA SPEC CULT: NORMAL
BUN SERPL-MCNC: 24 MG/DL (ref 6–20)
BUN/CREAT SERPL: 38 (ref 12–20)
C JEJUNI+C COLI AG STL QL: NEGATIVE
CALCIUM SERPL-MCNC: 8.5 MG/DL (ref 8.5–10.1)
CALCULATED P AXIS, ECG09: 10 DEGREES
CALCULATED R AXIS, ECG10: 40 DEGREES
CALCULATED T AXIS, ECG11: 33 DEGREES
CHLORIDE SERPL-SCNC: 118 MMOL/L (ref 97–108)
CO2 SERPL-SCNC: 19 MMOL/L (ref 21–32)
CREAT SERPL-MCNC: 0.63 MG/DL (ref 0.55–1.02)
DIAGNOSIS, 93000: NORMAL
E COLI SXT1+2 STL IA: NEGATIVE
ERYTHROCYTE [DISTWIDTH] IN BLOOD BY AUTOMATED COUNT: 14.8 % (ref 11.5–14.5)
GLUCOSE BLD STRIP.AUTO-MCNC: 120 MG/DL (ref 65–100)
GLUCOSE BLD STRIP.AUTO-MCNC: 128 MG/DL (ref 65–100)
GLUCOSE BLD STRIP.AUTO-MCNC: 132 MG/DL (ref 65–100)
GLUCOSE BLD STRIP.AUTO-MCNC: 140 MG/DL (ref 65–100)
GLUCOSE SERPL-MCNC: 128 MG/DL (ref 65–100)
HCT VFR BLD AUTO: 31.8 % (ref 35–47)
HGB BLD-MCNC: 10.5 G/DL (ref 11.5–16)
MAGNESIUM SERPL-MCNC: 1.9 MG/DL (ref 1.6–2.4)
MCH RBC QN AUTO: 30.7 PG (ref 26–34)
MCHC RBC AUTO-ENTMCNC: 33 G/DL (ref 30–36.5)
MCV RBC AUTO: 93 FL (ref 80–99)
NRBC # BLD: 0 K/UL (ref 0–0.01)
NRBC BLD-RTO: 0 PER 100 WBC
P-R INTERVAL, ECG05: 162 MS
PLATELET # BLD AUTO: 155 K/UL (ref 150–400)
PMV BLD AUTO: 10.9 FL (ref 8.9–12.9)
POTASSIUM SERPL-SCNC: 3 MMOL/L (ref 3.5–5.1)
Q-T INTERVAL, ECG07: 328 MS
QRS DURATION, ECG06: 66 MS
QTC CALCULATION (BEZET), ECG08: 399 MS
RBC # BLD AUTO: 3.42 M/UL (ref 3.8–5.2)
SERVICE CMNT-IMP: ABNORMAL
SERVICE CMNT-IMP: NORMAL
SERVICE CMNT-IMP: NORMAL
SODIUM SERPL-SCNC: 148 MMOL/L (ref 136–145)
VENTRICULAR RATE, ECG03: 89 BPM
WBC # BLD AUTO: 11.7 K/UL (ref 3.6–11)

## 2018-06-18 PROCEDURE — 65270000029 HC RM PRIVATE

## 2018-06-18 PROCEDURE — C9113 INJ PANTOPRAZOLE SODIUM, VIA: HCPCS | Performed by: HOSPITALIST

## 2018-06-18 PROCEDURE — 80048 BASIC METABOLIC PNL TOTAL CA: CPT | Performed by: NURSE PRACTITIONER

## 2018-06-18 PROCEDURE — 74011250637 HC RX REV CODE- 250/637: Performed by: NURSE PRACTITIONER

## 2018-06-18 PROCEDURE — 82962 GLUCOSE BLOOD TEST: CPT

## 2018-06-18 PROCEDURE — 74011250636 HC RX REV CODE- 250/636: Performed by: HOSPITALIST

## 2018-06-18 PROCEDURE — 85027 COMPLETE CBC AUTOMATED: CPT | Performed by: HOSPITALIST

## 2018-06-18 PROCEDURE — 74011000258 HC RX REV CODE- 258: Performed by: HOSPITALIST

## 2018-06-18 PROCEDURE — 83735 ASSAY OF MAGNESIUM: CPT | Performed by: NURSE PRACTITIONER

## 2018-06-18 PROCEDURE — 74011250636 HC RX REV CODE- 250/636: Performed by: NURSE PRACTITIONER

## 2018-06-18 PROCEDURE — 74011000250 HC RX REV CODE- 250: Performed by: HOSPITALIST

## 2018-06-18 PROCEDURE — 36415 COLL VENOUS BLD VENIPUNCTURE: CPT | Performed by: NURSE PRACTITIONER

## 2018-06-18 RX ORDER — POTASSIUM CHLORIDE 750 MG/1
40 TABLET, FILM COATED, EXTENDED RELEASE ORAL
Status: COMPLETED | OUTPATIENT
Start: 2018-06-18 | End: 2018-06-18

## 2018-06-18 RX ORDER — LISINOPRIL 5 MG/1
2.5 TABLET ORAL DAILY
Status: DISCONTINUED | OUTPATIENT
Start: 2018-06-18 | End: 2018-06-19

## 2018-06-18 RX ORDER — HYDRALAZINE HYDROCHLORIDE 20 MG/ML
10 INJECTION INTRAMUSCULAR; INTRAVENOUS
Status: DISCONTINUED | OUTPATIENT
Start: 2018-06-18 | End: 2018-06-27 | Stop reason: HOSPADM

## 2018-06-18 RX ORDER — DEXTROSE AND POTASSIUM CHLORIDE 5; .15 G/100ML; G/100ML
SOLUTION INTRAVENOUS CONTINUOUS
Status: DISCONTINUED | OUTPATIENT
Start: 2018-06-18 | End: 2018-06-19

## 2018-06-18 RX ORDER — LOPERAMIDE HYDROCHLORIDE 2 MG/1
2 CAPSULE ORAL
Status: DISCONTINUED | OUTPATIENT
Start: 2018-06-18 | End: 2018-06-27 | Stop reason: HOSPADM

## 2018-06-18 RX ADMIN — CEFTRIAXONE SODIUM 2 G: 2 INJECTION, POWDER, FOR SOLUTION INTRAMUSCULAR; INTRAVENOUS at 23:51

## 2018-06-18 RX ADMIN — POTASSIUM CHLORIDE 20 MEQ: 40 SOLUTION ORAL at 09:26

## 2018-06-18 RX ADMIN — SODIUM CHLORIDE 40 MG: 9 INJECTION INTRAMUSCULAR; INTRAVENOUS; SUBCUTANEOUS at 09:26

## 2018-06-18 RX ADMIN — ACETAMINOPHEN 650 MG: 325 TABLET ORAL at 12:55

## 2018-06-18 RX ADMIN — Medication 10 ML: at 21:50

## 2018-06-18 RX ADMIN — Medication 10 ML: at 14:42

## 2018-06-18 RX ADMIN — POTASSIUM CHLORIDE 40 MEQ: 750 TABLET, EXTENDED RELEASE ORAL at 09:26

## 2018-06-18 RX ADMIN — DEXTROSE MONOHYDRATE, SODIUM CHLORIDE, AND POTASSIUM CHLORIDE 50 ML/HR: 50; 4.5; 1.49 INJECTION, SOLUTION INTRAVENOUS at 05:38

## 2018-06-18 RX ADMIN — ENOXAPARIN SODIUM 40 MG: 100 INJECTION SUBCUTANEOUS at 23:51

## 2018-06-18 RX ADMIN — Medication 10 ML: at 23:53

## 2018-06-18 RX ADMIN — Medication 10 ML: at 05:38

## 2018-06-18 RX ADMIN — DEXTROSE MONOHYDRATE AND POTASSIUM CHLORIDE: 5; .149 INJECTION, SOLUTION INTRAVENOUS at 10:46

## 2018-06-18 RX ADMIN — ACETAMINOPHEN 650 MG: 325 TABLET ORAL at 21:49

## 2018-06-18 RX ADMIN — LISINOPRIL 2.5 MG: 5 TABLET ORAL at 09:26

## 2018-06-18 RX ADMIN — HYDRALAZINE HYDROCHLORIDE 10 MG: 20 INJECTION INTRAMUSCULAR; INTRAVENOUS at 14:31

## 2018-06-18 NOTE — PROGRESS NOTES
Hospitalist Progress Note  Marquis Kaley NP  Answering service: [phone] from in house phone  Cell: [phone]      Date of Service:  2018  NAME:  Arvin Grey  :  1940  MRN:  [id number]    Admission Summary:   Pt presented to the ED with fever. The patient was found to have temperature of 104 this morning at the [facility] facility - she was given tylenol which brought down her temp to 100.3. She also had an episode of vomiting/poor appetite and her urine was darker than usual.  EMS indicated that staff at [address] [place] [address] was concerned pt aspirated. Interval history / Subjective:      BP elevated this am, added back lisinopril, prn hydralazine  Pending UCX results, discussed with micro lab  Afebrile, leukocytosis improved  Mild hypernatremia, start d5w at 50 ml/hr with potassium supplementation  Still with diarrhea, immodium prn, c/w FMS  Lactose free diet    Assessment & Plan:     Severe sepsis (hypotension, fever, lactic acidosis, leukocytosis with UTI) POA:    - + pyuria. UA was turbid with + large leuks, WBC 20-50 and 4+ bacteria.  Culture pending.  - + watery liquid stool - c-diff NEGATIVE -> Flagyl stopped ()  - lactic acidosis resolved   - continue IV Rocephin (stop date ) for UTI    Hypokalemia: replete po and c/w IVF    Hypernatremia  - stop d5 1/2 ns and switch to d5w with potassium    Hx of hypertension:   -  added back lisinopril  - hydralazine prn    Diarrhea  - stool culture is negative, c-diff negative  - will need fecal fat and enzymes as outpatient if continues to analyze for malabsorption from pancreatic insufficiency  - lactose free diet  - immodium prn    Dehydration with elevated Creatinine: Resolved  - Cr now normal so will decrease IVF    Hx Brain injury with functional quadriplegia:    - Bed bound, Supportive care, reposition patient  - communicates with simple words    Chronic Schafer Catheter  - ED changed catheter per nursing report      Code status: DNR  DVT prophylaxis: Lovenox   Care Plan discussed with: delmar, nurse, attending  Disposition: pt in long term care  POA is Roscoe Trevino, her nephew: [phone]. Hospital Problems  Never Reviewed          Codes Class Noted POA    * (Principal)Sepsis [facility]) ICD-10-CM: A41.9  ICD-9-CM: 038.9, 995.91  6/15/2018 Yes        Catheter-associated urinary tract infection ([facility] ICD-10-CM: T83.511A, N39.0  ICD-9-CM: 996.64, 599.0  6/15/2018 Yes        Diarrhea ICD-10-CM: R19.7  ICD-9-CM: 787.91  6/15/2018 Yes        Functional quadriplegia ([facility] ICD-10-CM: R53.2  ICD-9-CM: 780.72  6/15/2018 Yes            Review of Systems:   Alert. Says she had tylenol for general pain  Answers in one/two words      Vital Signs:    Last 24hrs VS reviewed since prior progress note. Most recent are:  Visit Vitals    BP (!) 190/97 (BP 1 Location: Left arm, BP Patient Position: At rest)    Pulse 78    Temp 97.9 °F (36.6 °C)    Resp 16    Ht 5' 2\" (1.575 m)    Wt 49.2 kg (108 lb 7.5 oz)    SpO2 98%    BMI 19.84 kg/m2       Intake/Output Summary (Last 24 hours) at 06/18/18 0943  Last data filed at 06/18/18 0540   Gross per 24 hour   Intake              760 ml   Output             1750 ml   Net             -990 ml      Physical Examination:         Constitutional:  No acute distress, cooperative, pleasant    ENT:  Oral mucous membranes dry. Lower denture with built up plaque. Pt has a protruding lower jaw and lip. Resp:  CTA bilaterally. No wheezing. No accessory muscle use and on RA   CV:  Regular rhythm, normal rate, no murmurs. GI:  Soft, non distended, NTP, normoactive bowel sounds. + Flexiseal in place with liquid/watery green-gray stool. Musculoskeletal:  No edema, warm, 2+ pulses throughout    Neurologic:  Moves all extremities.   AAOx2       Data Review:   Review and/or order of clinical lab test  Review and/or order of tests in the radiology section of CPT  Review and/or order of tests in the medicine section of [facility]    Labs:     Recent Labs      06/18/18   0430  06/17/18   0255   WBC  11.7*  13.9*   HGB  10.5*  10.4*   HCT  31.8*  32.2*   PLT  155  142*     Recent Labs      06/18/18   0349  06/17/18   0255  06/16/18   1015   NA  148*  146*  144   K  3.0*  3.0*  3.5   CL  118*  115*  113*   CO2  19*  19*  22   BUN  24*  35*  39*   CREA  0.63  0.78  1.06*   GLU  128*  92  93   CA  8.5  8.5  8.0*   MG  1.9   --   2.1   PHOS   --    --   3.0     Recent Labs      06/15/18   1836   SGOT  44*   ALT  18   AP  78   TBILI  0.6   TP  7.4   ALB  2.8*   GLOB  4.6*     No results for input(s): INR, PTP, APTT in the last 72 hours. No lab exists for component: INREXT, INREXT   No results for input(s): FE, TIBC, PSAT, FERR in the last 72 hours. No results found for: FOL, RBCF   No results for input(s): PH, PCO2, PO2 in the last 72 hours. No results for input(s): CPK, CKNDX, TROIQ in the last 72 hours.     No lab exists for component: CPKMB  No results found for: CHOL, CHOLX, CHLST, CHOLV, HDL, LDL, LDLC, DLDLP, TGLX, TRIGL, TRIGP, CHHD, CHHDX  Lab Results   Component Value Date/Time    Glucose (POC) 120 (H) 06/18/2018 06:24 AM    Glucose (POC) 126 (H) 06/17/2018 09:17 PM    Glucose (POC) 97 06/17/2018 04:20 PM    Glucose (POC) 105 (H) 06/17/2018 12:41 PM     Lab Results   Component Value Date/Time    Color DARK YELLOW 06/15/2018 07:48 PM    Appearance TURBID (A) 06/15/2018 07:48 PM    Specific gravity 1.024 06/15/2018 07:48 PM    pH (UA) 5.0 06/15/2018 07:48 PM    Protein 30 (A) 06/15/2018 07:48 PM    Glucose NEGATIVE  06/15/2018 07:48 PM    Ketone TRACE (A) 06/15/2018 07:48 PM    Urobilinogen 1.0 06/15/2018 07:48 PM    Nitrites NEGATIVE  06/15/2018 07:48 PM    Leukocyte Esterase LARGE (A) 06/15/2018 07:48 PM    Epithelial cells MODERATE (A) 06/15/2018 07:48 PM    Bacteria 4+ (A) 06/15/2018 07:48 PM    WBC 20-50 06/15/2018 07:48 PM    RBC 0-5 06/15/2018 07:48 PM     Medications Reviewed:     Current Facility-Administered Medications   Medication Dose Route Frequency    lisinopril (PRINIVIL, ZESTRIL) tablet 2.5 mg  2.5 mg Oral DAILY    hydrALAZINE (APRESOLINE) 20 mg/mL injection 10 mg  10 mg IntraVENous Q6H PRN    dextrose 5% - 0.45% NaCl with KCl 20 mEq/L infusion  50 mL/hr IntraVENous CONTINUOUS    potassium chloride (KAON 10%) 20 mEq/15 mL oral liquid 20 mEq  20 mEq Oral DAILY    acetaminophen (TYLENOL) tablet 650 mg  650 mg Oral Q6H PRN    albuterol-ipratropium (DUO-NEB) 2.5 MG-0.5 MG/3 ML  3 mL Nebulization Q4H PRN    pantoprazole (PROTONIX) 40 mg in sodium chloride 0.9% 10 mL injection  40 mg IntraVENous DAILY    cefTRIAXone (ROCEPHIN) 2 g in 0.9% sodium chloride (MBP/ADV) 50 mL  2 g IntraVENous Q24H    sodium chloride (NS) flush 5-10 mL  5-10 mL IntraVENous Q8H    sodium chloride (NS) flush 5-10 mL  5-10 mL IntraVENous PRN    naloxone (NARCAN) injection 0.4 mg  0.4 mg IntraVENous PRN    enoxaparin (LOVENOX) injection 40 mg  40 mg SubCUTAneous Q24H    ondansetron (ZOFRAN) injection 4 mg  4 mg IntraVENous Q4H PRN   ______________________________________________________________________  EXPECTED LENGTH OF STAY: - - -  ACTUAL LENGTH OF STAY:          3               Kath Kelly V, NP

## 2018-06-18 NOTE — PROGRESS NOTES
Brief review of chart. Patient admitted here from [facility]. Updates sent via [facility]ink to referring facility.   JOSE DANIEL AlonzoW, CRM

## 2018-06-18 NOTE — CDMP QUERY
Please clarify if this patient is (was) being treated/managed for:     => Acute Kidney Injury in the setting of dehydration requiring fluid bolus, IVFs, lab monitoring   => Other explanation of clinical findings  => Clinically Undetermined (no explanation for clinical findings)    The medical record reflects the following clinical findings, treatment, and risk factors. Risk Factors:  advanced age, bedbound, functional quadriplegia, indwelling catheter   Clinical Indicators:  6/15/18          BUN: 45     Creatinine: 1.76     GFR est AA: 34     recovered to   BUN: 24    Creatinine: 0.63     GFR est AA: >60     Treatment: fluid bolus, IVFs, lab monitoring     Please clarify and document your clinical opinion in the progress notes and discharge summary including the definitive and/or presumptive diagnosis, (suspected or probable), related to the above clinical findings. Please include clinical findings supporting your diagnosis.       Thank you,    Denise Willett RN  [facility]  [phone]

## 2018-06-18 NOTE — CDMP QUERY
Patient is noted to have a BMI of 19.84. Please clarify if this patient is:     => Underweight  => Cachexia  => Failure to Thrive  => Other explanation of clinical findings  => Clinically Undetermined (no explanation for clinical findings)    Presentation:  BMI: 19.84    Ht: 5' 2\" (1.575 m)    Wt: 49.2 kg (108 lb 7.5 oz)    Please clarify and document your clinical opinion in the progress notes and discharge summary, including the definitive and or presumptive diagnosis, (suspected or probable), related to the above clinical findings. Please include clinical findings supporting your diagnosis.        Thank you,    Phuong Butler RN  [facility]  [phone]

## 2018-06-18 NOTE — WOUND CARE
WOCN Note:     New consult placed by RN for sacrum. Chart shows:  Admitted for sepsis; history of quadriplegia from brain injury  Admitted from nursing home. Seen today with NP, Td Kelly. Assessment:   Patient is communicative with slow speech. Requires full assists in repositioning. Patient has a Schafer and a Flexi Seal and documentation shows a 6/16/18 insertion date. Bed: total care  Patient reports no pain. Left heel scar tissue, dry and pink and blanching. Foot drop noted. Heels offloaded with pillows - Prevalon boots not ideal due to foot drop. Right great toe tip with dry, light burgundy flacky tissue. Hypopigmented scar tissue on right ear. Sacrum with intact hypopigmented scar tissue, very prominent sacrum - total care SPORT bed ordered via [facility]. Patient repositioned on right side. Recommendations:    Barrier cream to buttocks and sacrum daily and as needed. Minimize layers of linen/pads under patient to optimize support surface. Turn/reposition approximately every 2 hours and offload heels. Specialty bed: total care SPORT ordered via [facility]. Use only flat sheet and one incontinence pad. Please call Ailyn Murillo if not delivered. Discussed above plan with patient & RN, Melita Pérez.     Transition of Care: Plan to follow weekly and as needed while admitted to hospital.      KIKO Pozo, RN, [facility]  Certified Wound, Ostomy, Continence Nurse  office [phone]  pager [phone] or call  to page

## 2018-06-18 NOTE — PROGRESS NOTES
SPEECH THERAPY SCREENING:  SERVICES ARE NOT INDICATED AT THIS TIME    [facility] was triggered for speech therapy based on results obtained during the nursing admission assessment. The patients chart was reviewed and the patient is not appropriate for a skilled therapy evaluation at this time. Please consult speech therapy if any therapy needs arise. Thank you.     Max Dhillon, SLP

## 2018-06-19 LAB
ANION GAP SERPL CALC-SCNC: 10 MMOL/L (ref 5–15)
BACTERIA SPEC CULT: ABNORMAL
BUN SERPL-MCNC: 14 MG/DL (ref 6–20)
BUN/CREAT SERPL: 27 (ref 12–20)
CALCIUM SERPL-MCNC: 9 MG/DL (ref 8.5–10.1)
CC UR VC: ABNORMAL
CHLORIDE SERPL-SCNC: 116 MMOL/L (ref 97–108)
CO2 SERPL-SCNC: 19 MMOL/L (ref 21–32)
CREAT SERPL-MCNC: 0.52 MG/DL (ref 0.55–1.02)
GLUCOSE BLD STRIP.AUTO-MCNC: 107 MG/DL (ref 65–100)
GLUCOSE BLD STRIP.AUTO-MCNC: 119 MG/DL (ref 65–100)
GLUCOSE BLD STRIP.AUTO-MCNC: 125 MG/DL (ref 65–100)
GLUCOSE BLD STRIP.AUTO-MCNC: 162 MG/DL (ref 65–100)
GLUCOSE SERPL-MCNC: 129 MG/DL (ref 65–100)
POTASSIUM SERPL-SCNC: 3.3 MMOL/L (ref 3.5–5.1)
SERVICE CMNT-IMP: ABNORMAL
SODIUM SERPL-SCNC: 145 MMOL/L (ref 136–145)

## 2018-06-19 PROCEDURE — 74011250637 HC RX REV CODE- 250/637: Performed by: NURSE PRACTITIONER

## 2018-06-19 PROCEDURE — 82962 GLUCOSE BLOOD TEST: CPT

## 2018-06-19 PROCEDURE — 74011250636 HC RX REV CODE- 250/636: Performed by: HOSPITALIST

## 2018-06-19 PROCEDURE — 65270000029 HC RM PRIVATE

## 2018-06-19 PROCEDURE — 80048 BASIC METABOLIC PNL TOTAL CA: CPT

## 2018-06-19 PROCEDURE — C9113 INJ PANTOPRAZOLE SODIUM, VIA: HCPCS | Performed by: HOSPITALIST

## 2018-06-19 PROCEDURE — 74011000258 HC RX REV CODE- 258: Performed by: HOSPITALIST

## 2018-06-19 PROCEDURE — 74011000250 HC RX REV CODE- 250: Performed by: HOSPITALIST

## 2018-06-19 PROCEDURE — 74011250636 HC RX REV CODE- 250/636: Performed by: NURSE PRACTITIONER

## 2018-06-19 PROCEDURE — 36415 COLL VENOUS BLD VENIPUNCTURE: CPT

## 2018-06-19 RX ORDER — LOPERAMIDE HYDROCHLORIDE 2 MG/1
4 CAPSULE ORAL ONCE
Status: COMPLETED | OUTPATIENT
Start: 2018-06-19 | End: 2018-06-19

## 2018-06-19 RX ORDER — LISINOPRIL 5 MG/1
5 TABLET ORAL DAILY
Status: DISCONTINUED | OUTPATIENT
Start: 2018-06-20 | End: 2018-06-27 | Stop reason: HOSPADM

## 2018-06-19 RX ORDER — POTASSIUM CHLORIDE 20MEQ/15ML
40 LIQUID (ML) ORAL DAILY
Status: DISCONTINUED | OUTPATIENT
Start: 2018-06-19 | End: 2018-06-26

## 2018-06-19 RX ORDER — LISINOPRIL 5 MG/1
2.5 TABLET ORAL
Status: COMPLETED | OUTPATIENT
Start: 2018-06-19 | End: 2018-06-19

## 2018-06-19 RX ADMIN — ACETAMINOPHEN 650 MG: 325 TABLET ORAL at 14:39

## 2018-06-19 RX ADMIN — DEXTROSE MONOHYDRATE AND POTASSIUM CHLORIDE: 5; .149 INJECTION, SOLUTION INTRAVENOUS at 08:08

## 2018-06-19 RX ADMIN — HYDRALAZINE HYDROCHLORIDE 10 MG: 20 INJECTION INTRAMUSCULAR; INTRAVENOUS at 20:33

## 2018-06-19 RX ADMIN — ACETAMINOPHEN 650 MG: 325 TABLET ORAL at 09:01

## 2018-06-19 RX ADMIN — Medication 10 ML: at 14:22

## 2018-06-19 RX ADMIN — Medication 10 ML: at 20:33

## 2018-06-19 RX ADMIN — CEFTRIAXONE SODIUM 2 G: 2 INJECTION, POWDER, FOR SOLUTION INTRAMUSCULAR; INTRAVENOUS at 22:49

## 2018-06-19 RX ADMIN — Medication 1 CAPSULE: at 09:02

## 2018-06-19 RX ADMIN — ACETAMINOPHEN 650 MG: 325 TABLET ORAL at 20:33

## 2018-06-19 RX ADMIN — ENOXAPARIN SODIUM 40 MG: 100 INJECTION SUBCUTANEOUS at 22:49

## 2018-06-19 RX ADMIN — SODIUM CHLORIDE 40 MG: 9 INJECTION INTRAMUSCULAR; INTRAVENOUS; SUBCUTANEOUS at 09:02

## 2018-06-19 RX ADMIN — LOPERAMIDE HYDROCHLORIDE 4 MG: 2 CAPSULE ORAL at 09:02

## 2018-06-19 RX ADMIN — POTASSIUM CHLORIDE 40 MEQ: 40 SOLUTION ORAL at 09:02

## 2018-06-19 RX ADMIN — LISINOPRIL 2.5 MG: 5 TABLET ORAL at 09:03

## 2018-06-19 RX ADMIN — Medication 10 ML: at 06:47

## 2018-06-19 RX ADMIN — LISINOPRIL 2.5 MG: 5 TABLET ORAL at 09:43

## 2018-06-19 RX ADMIN — HYDRALAZINE HYDROCHLORIDE 10 MG: 20 INJECTION INTRAMUSCULAR; INTRAVENOUS at 14:38

## 2018-06-19 RX ADMIN — Medication 10 ML: at 22:50

## 2018-06-19 NOTE — PROGRESS NOTES
Problem: Pressure Injury - Risk of  Goal: *Prevention of pressure injury  Document Refugio Scale and appropriate interventions in the flowsheet    Air mattress  Offload heels with pillows  Barrier cream.    Outcome: Progressing Towards Goal  Pressure Injury Interventions:  Sensory Interventions: Assess changes in LOC, Avoid rigorous massage over bony prominences, Check visual cues for pain, Keep linens dry and wrinkle-free, Minimize linen layers    Moisture Interventions: Apply protective barrier, creams and emollients, Minimize layers    Activity Interventions: Assess need for specialty bed, Pressure redistribution bed/mattress(bed type)    Mobility Interventions: Float heels, HOB 30 degrees or less, Pressure redistribution bed/mattress (bed type), Turn and reposition approx.  every two hours(pillow and wedges)    Nutrition Interventions: Document food/fluid/supplement intake    Friction and Shear Interventions: HOB 30 degrees or less, Lift sheet, Lift team/patient mobility team, Minimize layers

## 2018-06-19 NOTE — PROGRESS NOTES
Problem: Falls - Risk of  Goal: *Absence of Falls  Document Selam Fall Risk and appropriate interventions in the flowsheet.    Outcome: Progressing Towards Goal  Fall Risk Interventions:       Mentation Interventions: Adequate sleep, hydration, pain control, Door open when patient unattended, Room close to nurse's station    Medication Interventions: Evaluate medications/consider consulting pharmacy    Elimination Interventions: Call light in reach, Toileting schedule/hourly rounds

## 2018-06-19 NOTE — PROGRESS NOTES
Spiritual Care Partner Volunteer visited patient in [address] [place] [address] on 6.19.18. Documented by: : Rev. Ollie Murphy.  Mariella France; [facility], to contact [phone] Shyam Rios call: [phone]

## 2018-06-19 NOTE — PROGRESS NOTES
Bedside shift change report given to [place]ne [place] (oncoming nurse) by Fabiola Angulo RN (offgoing nurse). Report included the following information SBAR, Kardex, ED Summary, [facility] and Recent Results.

## 2018-06-19 NOTE — PROGRESS NOTES
Hospitalist Progress Note   ATILIO العلي  Answering service: [phone] from in house phone  Cell: [phone]      Date of Service:  2018  NAME:  Paul Davis  :  1940  MRN:  [id number]    Admission Summary:   Pt presented to the ED with fever. The patient was found to have temperature of 104 this morning at the [facility] facility - she was given tylenol which brought down her temp to 100.3. She also had an episode of vomiting/poor appetite and her urine was darker than usual.  EMS indicated that staff at [address] [place] [address] was concerned pt aspirated. Interval history / Subjective:      BP still elevated this am, increased lisinopril, prn hydralazine  Last day of ceftriaxone for UTI  Afebrile  Discussed with nursing, if output from FMS is < 50cc by 3 pm, this should be pulled  Immodium x1  D/c d5w  Replete potassium    Assessment & Plan:     Severe sepsis (hypotension, fever, lactic acidosis, leukocytosis with UTI) POA:    - + pyuria. UA was turbid with + large leuks, WBC 20-50 and 4+ bacteria.  Culture pending.  - + watery liquid stool - c-diff NEGATIVE -> Flagyl stopped ()  - lactic acidosis resolved   - continue IV Rocephin (stop date ) for UTI    CAUTI, has long chronic mosquera, E-coli and Providenecia UTI - treated with 5 days of ceftriaxone    Hypokalemia: replete po and c/w IVF    Hypernatremia - improved  - stop d5 1/2 ns and switch to d5w with potassium    Hx of hypertension:    -  added back lisinopril  - hydralazine prn    Diarrhea  - stool culture is negative, c-diff negative  - will need fecal fat and enzymes as outpatient if continues to analyze for malabsorption from pancreatic insufficiency  - lactose free diet  - immodium prn    Dehydration with elevated Creatinine: Resolved  - Cr now normal so will decrease IVF    Hx Brain injury with functional quadriplegia:    - Bed bound, Supportive care, reposition patient  - communicates with simple words    Chronic Schafer Catheter  - ED changed catheter per nursing report      Code status: DNR  DVT prophylaxis: Lovenox   Care Plan discussed with: delmar, nurse, attending  Disposition: pt in long term care  POA is Jill David, her nephew: [phone]. Hospital Problems  Never Reviewed          Codes Class Noted POA    * (Principal)Sepsis [facility]) ICD-10-CM: A41.9  ICD-9-CM: 038.9, 995.91  6/15/2018 Yes        Catheter-associated urinary tract infection ([facility] [address] ICD-10-CM: T83.511A, N39.0  ICD-9-CM: 996.64, 599.0  6/15/2018 Yes        Diarrhea ICD-10-CM: R19.7  ICD-9-CM: 787.91  6/15/2018 Yes        Functional quadriplegia ([facility] [address] ICD-10-CM: R53.2  ICD-9-CM: 780.72  6/15/2018 Yes            Review of Systems:   Alert. Answers in one/two words  Difficult to obtain      Vital Signs:    Last 24hrs VS reviewed since prior progress note. Most recent are:  Visit Vitals    /77 (BP 1 Location: Left arm, BP Patient Position: At rest)    Pulse 79    Temp 97.9 °F (36.6 °C)    Resp 16    Ht 5' 2\" (1.575 m)    Wt 49.2 kg (108 lb 7.5 oz)    SpO2 99%    BMI 19.84 kg/m2       Intake/Output Summary (Last 24 hours) at 06/19/18 0934  Last data filed at 06/19/18 0809   Gross per 24 hour   Intake             2424 ml   Output             1925 ml   Net              499 ml      Physical Examination:         Constitutional:  No acute distress, cooperative, pleasant    ENT:  Oral mucous membranes dry. Lower denture with built up plaque. Pt has a protruding lower jaw and lip. Resp:  CTA bilaterally. No wheezing. No accessory muscle use and on RA   CV:  Regular rhythm, normal rate, no murmurs. GI:  Soft, non distended, NTP, normoactive bowel sounds. + Flexiseal in place    Musculoskeletal:  No edema, warm, 2+ pulses throughout    Neurologic:  Moves all extremities.   AAOx2       Data Review:   Review and/or order of clinical lab test  Review and/or order of tests in the radiology section of CPT  Review and/or order of tests in the medicine section of [facility]    Labs:     Recent Labs      06/18/18   0430  06/17/18   0255   WBC  11.7*  13.9*   HGB  10.5*  10.4*   HCT  31.8*  32.2*   PLT  155  142*     Recent Labs      06/19/18   0649  06/18/18   0349  06/17/18   0255  06/16/18   1015   NA  145  148*  146*  144   K  3.3*  3.0*  3.0*  3.5   CL  116*  118*  115*  113*   CO2  19*  19*  19*  22   BUN  14  24*  35*  39*   CREA  0.52*  0.63  0.78  1.06*   GLU  129*  128*  92  93   CA  9.0  8.5  8.5  8.0*   MG   --   1.9   --   2.1   PHOS   --    --    --   3.0     No results for input(s): SGOT, GPT, ALT, AP, TBIL, TBILI, TP, ALB, GLOB, GGT, AML, LPSE in the last 72 hours. No lab exists for component: AMYP, HLPSE  No results for input(s): INR, PTP, APTT in the last 72 hours. No lab exists for component: INREXT, INREXT   No results for input(s): FE, TIBC, PSAT, FERR in the last 72 hours. No results found for: FOL, RBCF   No results for input(s): PH, PCO2, PO2 in the last 72 hours. No results for input(s): CPK, CKNDX, TROIQ in the last 72 hours.     No lab exists for component: CPKMB  No results found for: CHOL, CHOLX, CHLST, CHOLV, HDL, LDL, LDLC, DLDLP, TGLX, TRIGL, TRIGP, CHHD, CHHDX  Lab Results   Component Value Date/Time    Glucose (POC) 119 (H) 06/19/2018 05:49 AM    Glucose (POC) 128 (H) 06/18/2018 09:15 PM    Glucose (POC) 140 (H) 06/18/2018 04:01 PM    Glucose (POC) 132 (H) 06/18/2018 11:29 AM    Glucose (POC) 120 (H) 06/18/2018 06:24 AM     Lab Results   Component Value Date/Time    Color DARK YELLOW 06/15/2018 07:48 PM    Appearance TURBID (A) 06/15/2018 07:48 PM    Specific gravity 1.024 06/15/2018 07:48 PM    pH (UA) 5.0 06/15/2018 07:48 PM    Protein 30 (A) 06/15/2018 07:48 PM    Glucose NEGATIVE  06/15/2018 07:48 PM    Ketone TRACE (A) 06/15/2018 07:48 PM    Urobilinogen 1.0 06/15/2018 07:48 PM    Nitrites NEGATIVE  06/15/2018 07:48 PM    Leukocyte Esterase LARGE (A) 06/15/2018 07:48 PM    Epithelial cells MODERATE (A) 06/15/2018 07:48 PM    Bacteria 4+ (A) 06/15/2018 07:48 PM    WBC 20-50 06/15/2018 07:48 PM    RBC 0-5 06/15/2018 07:48 PM     Medications Reviewed:     Current Facility-Administered Medications   Medication Dose Route Frequency    potassium chloride (KAON 10%) 20 mEq/15 mL oral liquid 40 mEq  40 mEq Oral DAILY    lactobac ac& pc-s.therm-b.anim (RAYSHAWN Q/RISAQUAD)  1 Cap Oral DAILY    [START ON 6/20/2018] lisinopril (PRINIVIL, ZESTRIL) tablet 5 mg  5 mg Oral DAILY    lisinopril (PRINIVIL, ZESTRIL) tablet 2.5 mg  2.5 mg Oral NOW    hydrALAZINE (APRESOLINE) 20 mg/mL injection 10 mg  10 mg IntraVENous Q6H PRN    loperamide (IMODIUM) capsule 2 mg  2 mg Oral Q4H PRN    dextrose 5% with KCl 20 mEq/L infusion   IntraVENous CONTINUOUS    acetaminophen (TYLENOL) tablet 650 mg  650 mg Oral Q6H PRN    albuterol-ipratropium (DUO-NEB) 2.5 MG-0.5 MG/3 ML  3 mL Nebulization Q4H PRN    pantoprazole (PROTONIX) 40 mg in sodium chloride 0.9% 10 mL injection  40 mg IntraVENous DAILY    cefTRIAXone (ROCEPHIN) 2 g in 0.9% sodium chloride (MBP/ADV) 50 mL  2 g IntraVENous Q24H    sodium chloride (NS) flush 5-10 mL  5-10 mL IntraVENous Q8H    sodium chloride (NS) flush 5-10 mL  5-10 mL IntraVENous PRN    naloxone (NARCAN) injection 0.4 mg  0.4 mg IntraVENous PRN    enoxaparin (LOVENOX) injection 40 mg  40 mg SubCUTAneous Q24H    ondansetron (ZOFRAN) injection 4 mg  4 mg IntraVENous Q4H PRN   ______________________________________________________________________  EXPECTED LENGTH OF STAY: 4d 21h  ACTUAL LENGTH OF STAY:          4               Kath Kelly V NP

## 2018-06-19 NOTE — PROGRESS NOTES
Bedside shift change report given to [address] [place] [address] (oncoming nurse) by Clementina Cordova RN (offgoing nurse). Report included the following information SBAR, Kardex, Intake/Output, MAR and Recent Results.

## 2018-06-19 NOTE — PROGRESS NOTES
0791: patient's BP is 198/77; patient stated she had some pain; patient had not taken morning BP medication yet.     0903: administered tylenol 650mg PO and lisinopril 2.5mg PO; will re-assess BP in 1hr    0943: administered modified BP medication PO (lisinopril 2.5mg); BP was 171/91    1010: paged Ambar Kelly to provide an update on current patient condition; also asked for clarification for parameters for Fecal management system removal    Orders received: re-check BP in 30mins; if there is less than 50cc of stool in the collection system at 1500 then the fecal management system can be removed.       1036: /81

## 2018-06-20 LAB
ANION GAP SERPL CALC-SCNC: 14 MMOL/L (ref 5–15)
BACTERIA SPEC CULT: NORMAL
BASOPHILS # BLD: 0 K/UL (ref 0–0.1)
BASOPHILS NFR BLD: 0 % (ref 0–1)
BUN SERPL-MCNC: 14 MG/DL (ref 6–20)
BUN/CREAT SERPL: 29 (ref 12–20)
CALCIUM SERPL-MCNC: 8.7 MG/DL (ref 8.5–10.1)
CHLORIDE SERPL-SCNC: 117 MMOL/L (ref 97–108)
CO2 SERPL-SCNC: 19 MMOL/L (ref 21–32)
CREAT SERPL-MCNC: 0.48 MG/DL (ref 0.55–1.02)
DIFFERENTIAL METHOD BLD: ABNORMAL
EOSINOPHIL # BLD: 0 K/UL (ref 0–0.4)
EOSINOPHIL NFR BLD: 0 % (ref 0–7)
ERYTHROCYTE [DISTWIDTH] IN BLOOD BY AUTOMATED COUNT: 14.9 % (ref 11.5–14.5)
GLUCOSE BLD STRIP.AUTO-MCNC: 129 MG/DL (ref 65–100)
GLUCOSE BLD STRIP.AUTO-MCNC: 131 MG/DL (ref 65–100)
GLUCOSE BLD STRIP.AUTO-MCNC: 150 MG/DL (ref 65–100)
GLUCOSE BLD STRIP.AUTO-MCNC: 98 MG/DL (ref 65–100)
GLUCOSE SERPL-MCNC: 115 MG/DL (ref 65–100)
HCT VFR BLD AUTO: 34.4 % (ref 35–47)
HGB BLD-MCNC: 11.4 G/DL (ref 11.5–16)
IMM GRANULOCYTES # BLD: 0 K/UL
IMM GRANULOCYTES NFR BLD AUTO: 0 %
LYMPHOCYTES # BLD: 1.6 K/UL (ref 0.8–3.5)
LYMPHOCYTES NFR BLD: 20 % (ref 12–49)
MCH RBC QN AUTO: 30.2 PG (ref 26–34)
MCHC RBC AUTO-ENTMCNC: 33.1 G/DL (ref 30–36.5)
MCV RBC AUTO: 91 FL (ref 80–99)
METAMYELOCYTES NFR BLD MANUAL: 1 %
MONOCYTES # BLD: 0.6 K/UL (ref 0–1)
MONOCYTES NFR BLD: 8 % (ref 5–13)
MYELOCYTES NFR BLD MANUAL: 4 %
NEUTS BAND NFR BLD MANUAL: 2 % (ref 0–6)
NEUTS SEG # BLD: 5.2 K/UL (ref 1.8–8)
NEUTS SEG NFR BLD: 65 % (ref 32–75)
NRBC # BLD: 0.02 K/UL (ref 0–0.01)
NRBC BLD-RTO: 0.3 PER 100 WBC
PLATELET # BLD AUTO: 178 K/UL (ref 150–400)
PMV BLD AUTO: 10.4 FL (ref 8.9–12.9)
POTASSIUM SERPL-SCNC: 3.5 MMOL/L (ref 3.5–5.1)
RBC # BLD AUTO: 3.78 M/UL (ref 3.8–5.2)
RBC MORPH BLD: ABNORMAL
SERVICE CMNT-IMP: ABNORMAL
SERVICE CMNT-IMP: NORMAL
SERVICE CMNT-IMP: NORMAL
SODIUM SERPL-SCNC: 150 MMOL/L (ref 136–145)
WBC # BLD AUTO: 7.8 K/UL (ref 3.6–11)

## 2018-06-20 PROCEDURE — 74011250637 HC RX REV CODE- 250/637: Performed by: NURSE PRACTITIONER

## 2018-06-20 PROCEDURE — 74011250636 HC RX REV CODE- 250/636: Performed by: HOSPITALIST

## 2018-06-20 PROCEDURE — 82962 GLUCOSE BLOOD TEST: CPT

## 2018-06-20 PROCEDURE — 74011250636 HC RX REV CODE- 250/636: Performed by: NURSE PRACTITIONER

## 2018-06-20 PROCEDURE — 92610 EVALUATE SWALLOWING FUNCTION: CPT | Performed by: SPEECH-LANGUAGE PATHOLOGIST

## 2018-06-20 PROCEDURE — 36415 COLL VENOUS BLD VENIPUNCTURE: CPT | Performed by: HOSPITALIST

## 2018-06-20 PROCEDURE — 51798 US URINE CAPACITY MEASURE: CPT

## 2018-06-20 PROCEDURE — 74011250637 HC RX REV CODE- 250/637: Performed by: INTERNAL MEDICINE

## 2018-06-20 PROCEDURE — 85025 COMPLETE CBC W/AUTO DIFF WBC: CPT | Performed by: HOSPITALIST

## 2018-06-20 PROCEDURE — 65270000029 HC RM PRIVATE

## 2018-06-20 PROCEDURE — 80048 BASIC METABOLIC PNL TOTAL CA: CPT | Performed by: HOSPITALIST

## 2018-06-20 RX ORDER — CHOLESTYRAMINE 4 G/4.8G
4 POWDER, FOR SUSPENSION ORAL 2 TIMES DAILY WITH MEALS
Status: DISCONTINUED | OUTPATIENT
Start: 2018-06-20 | End: 2018-06-25

## 2018-06-20 RX ORDER — DEXTROSE MONOHYDRATE 50 MG/ML
25 INJECTION, SOLUTION INTRAVENOUS CONTINUOUS
Status: DISCONTINUED | OUTPATIENT
Start: 2018-06-20 | End: 2018-06-25

## 2018-06-20 RX ADMIN — DEXTROSE MONOHYDRATE 50 ML/HR: 5 INJECTION, SOLUTION INTRAVENOUS at 09:08

## 2018-06-20 RX ADMIN — ACETAMINOPHEN 650 MG: 325 TABLET ORAL at 04:58

## 2018-06-20 RX ADMIN — Medication 10 ML: at 09:08

## 2018-06-20 RX ADMIN — Medication 10 ML: at 23:10

## 2018-06-20 RX ADMIN — ACETAMINOPHEN 650 MG: 325 TABLET ORAL at 14:50

## 2018-06-20 RX ADMIN — CHOLESTYRAMINE 4 G: 4 POWDER, FOR SUSPENSION ORAL at 08:59

## 2018-06-20 RX ADMIN — Medication 10 ML: at 07:18

## 2018-06-20 RX ADMIN — ENOXAPARIN SODIUM 40 MG: 100 INJECTION SUBCUTANEOUS at 23:10

## 2018-06-20 RX ADMIN — LISINOPRIL 5 MG: 5 TABLET ORAL at 08:59

## 2018-06-20 RX ADMIN — CHOLESTYRAMINE 4 G: 4 POWDER, FOR SUSPENSION ORAL at 17:46

## 2018-06-20 RX ADMIN — PANTOPRAZOLE SODIUM 40 MG: 40 TABLET, DELAYED RELEASE ORAL at 07:18

## 2018-06-20 RX ADMIN — POTASSIUM CHLORIDE 40 MEQ: 40 SOLUTION ORAL at 08:59

## 2018-06-20 RX ADMIN — Medication 1 CAPSULE: at 08:59

## 2018-06-20 NOTE — PROGRESS NOTES
Bedside shift change report given to [facility], [address] (oncoming nurse) by Tanja Hicks RN (offgoing nurse). Report included the following information SBAR, Kardex, ED Summary, [facility] and Recent Results. 8:17 PM: Patient's BP is 195/101. Patient received one dose of IV hydralazine earlier this afternoon and can another PRN dose at 8:30. Will give and recheck BP within 45 minutes. 9:11 PM: 10 mg hydralazine given at 8:33, rechecked BP just now and it is 135/69. Will recheck again and continue to monitor. 10: 57 PM: Checked patient's BP again - 144/74. Will continue to monitor.

## 2018-06-20 NOTE — PROGRESS NOTES
Problem: Dysphagia (Adult)  Goal: *Acute Goals and Plan of Care (Insert Text)  Speech Therapy Goals  Initiated 6/20/2018  1. Patient will tolerate dysphagia 1 diet with nectar thick liquids without signs/symptoms of aspiration within 7day(s). 2.  Patient will tolerate trials of thin liquids with SLP within 7 days. Speech LAnguage Pathology bedside swallow evaluation  Patient: Shahid Holder (78 y.o. female)  Date: 6/20/2018  Primary Diagnosis: Sepsis ([facility] [address]        Precautions: Aspiration       ASSESSMENT :  Based on the objective data described below, the patient presents with moderate oropharyngeal dysphagia characterized by oral motor weakness, pharyngeal swallow delay and reduced laryngeal elevation via palpation. Dysphagia resulted in impaired bolus retrieval with anterior spillage, slow oral transit. Patient tolerated all trials without overt s/s aspiration however per documentation from skilled nursing facility patient receiving pureed diet, nectar liquids. Patient at risk for aspiration given dysphagia. Patient will benefit from skilled intervention to address the above impairments. Patients rehabilitation potential is considered to be Fair  Factors which may influence rehabilitation potential include:   []            None noted  [x]            Mental ability/status  [x]            Medical condition  [x]            Home/family situation and support systems  []            Safety awareness  []            Pain tolerance/management  []            Other:      PLAN :  Recommendations and Planned Interventions:  Pureed diet, nectar liquids  Frequency/Duration: Patient will be followed by speech-language pathology 3 times a week to address goals. Discharge Recommendations: To Be Determined     SUBJECTIVE:   Patient stated her name. Minimal verbalizations, mostly unintelligible.     OBJECTIVE:     Past Medical History:   Diagnosis Date    Diabetes ([facility] [address]     Gastrointestinal disorder     intestinal obstruction, gastro-esophageal reflux    Hypertension     Ill-defined condition     demyelinating disease of central nervous system    Ill-defined condition     dysphagia, oropharyngeal phase    Ill-defined condition     neuromuscular dysfunction of bladder, retention of urine   No past surgical history on file. Prior Level of Function/Home Situation:   Home Situation  Home Environment: Long term care  One/Two Story Residence: One story  Living Alone: No  Support Systems: Family member(s)  Patient Expects to be Discharged to[de-identified] Other (comment)  Current DME Used/Available at Home: None  Diet prior to admission: Puree, nectar per chart  Current Diet:  Puree   Cognitive and Communication Status:  Neurologic State: Alert  Orientation Level: Oriented to person  Cognition: Follows commands           Oral Assessment:  Oral Assessment  Labial: Decreased rate;Decreased seal  Dentition: Natural;Extractions;Limited  Lingual: Decreased rate;Decreased strength  Velum: Unable to visualize  Mandible: Restricted  P.O. Trials:  Patient Position: Upright in bed  Vocal quality prior to P.O.: Low volume  Consistency Presented: Ice chips; Thin liquid; Nectar thick liquid;Puree  How Presented: SLP-fed/presented;Spoon;Straw     Bolus Acceptance: Impaired  Bolus Formation/Control: Impaired  Type of Impairment: Delayed; Anterior;Spillage;Drooling  Propulsion: Delayed (# of seconds)  Oral Residue: 10-50% of bolus  Initiation of Swallow: Delayed (# of seconds)  Laryngeal Elevation: Decreased;Weak  Aspiration Signs/Symptoms: None                Oral Phase Severity: Moderate  Pharyngeal Phase Severity : Moderate    NOMS:   The NOMS functional outcome measure was used to quantify this patient's level of swallowing impairment. Based on the NOMS, the patient was determined to be at level 4 for swallow function     G Codes:   In compliance with CMSs Claims Based Outcome Reporting, the following G-code set was chosen for this patient based the use of the NOMS functional outcome to quantify this patient's level of swallowing impairment. Using the NOMS, the patient was determined to be at level 3 for swallow function which correlates with the CL= 60-79% level of severity. Based on the objective assessment provided within this note, the current, goal, and discharge g-codes are as follows:    Swallow  Swallowing:   Swallow Current Status CL= 60-79%   Swallow Goal Status CK= 40-59%      NOMS Swallowing Levels:  Level 1 (CN): NPO  Level 2 (CM): NPO but takes consistency in therapy  Level 3 (CL): Takes less than 50% of nutrition p.o. and continues with nonoral feedings; and/or safe with mod cues; and/or max diet restriction  Level 4 (CK): Safe swallow but needs mod cues; and/or mod diet restriction; and/or still requires some nonoral feeding/supplements  Level 5 (CJ): Safe swallow with min diet restriction; and/or needs min cues  Level 6 (CI): Independent with p.o.; rare cues; usually self cues; may need to avoid some foods or needs extra time  Level 7 ([address] [place] [address]): Independent for all p.o.  MCKENNA. (2003). National Outcomes Measurement System (NOMS): Adult Speech-Language Pathology User's Guide. After treatment:   []            Patient left in no apparent distress sitting up in chair  [x]            Patient left in no apparent distress in bed  [x]            Call bell left within reach  []            Nursing notified  []            Caregiver present  []            Bed alarm activated    COMMUNICATION/EDUCATION:   The patients plan of care including recommendations, planned interventions, and recommended diet changes were discussed with:     Patient was educated regarding role of SLP and POC. Patient did not respond. []            Posted safety precautions in patient's room. []            Patient/family have participated as able in goal setting and plan of care. []            Patient/family agree to work toward stated goals and plan of care.   [] Patient understands intent and goals of therapy, but is neutral about his/her participation. [x]            Patient is unable to participate in goal setting and plan of care.     Thank you for this referral.  SANDRA Stanley  Time Calculation: 15 mins

## 2018-06-20 NOTE — PROGRESS NOTES
Problem: Falls - Risk of  Goal: *Absence of Falls  Document Selam Fall Risk and appropriate interventions in the flowsheet.    Outcome: Progressing Towards Goal  Fall Risk Interventions:       Mentation Interventions: Adequate sleep, hydration, pain control    Medication Interventions: Evaluate medications/consider consulting pharmacy    Elimination Interventions: Call light in reach

## 2018-06-20 NOTE — PROGRESS NOTES
Problem: Pressure Injury - Risk of  Goal: *Prevention of pressure injury  Document Refugio Scale and appropriate interventions in the flowsheet    Air mattress  Offload heels with pillows  Barrier cream.    Outcome: Progressing Towards Goal  Pressure Injury Interventions:  Sensory Interventions: Assess changes in LOC, Avoid rigorous massage over bony prominences, Check visual cues for pain, Keep linens dry and wrinkle-free, Minimize linen layers    Moisture Interventions: Internal/External fecal devices, Internal/External urinary devices, Minimize layers, Apply protective barrier, creams and emollients    Activity Interventions: Pressure redistribution bed/mattress(bed type)    Mobility Interventions: Float heels, HOB 30 degrees or less, Pressure redistribution bed/mattress (bed type), Turn and reposition approx.  every two hours(pillow and wedges)    Nutrition Interventions: Document food/fluid/supplement intake, Offer support with meals,snacks and hydration    Friction and Shear Interventions: Apply protective barrier, creams and emollients, HOB 30 degrees or less, Minimize layers

## 2018-06-20 NOTE — PROGRESS NOTES
Problem: Pressure Injury - Risk of  Goal: *Prevention of pressure injury  Document Refugio Scale and appropriate interventions in the flowsheet    Air mattress  Offload heels with pillows  Barrier cream.    Outcome: Progressing Towards Goal  Pressure Injury Interventions:  Sensory Interventions: Assess changes in LOC    Moisture Interventions: Maintain skin hydration (lotion/cream)    Activity Interventions: Pressure redistribution bed/mattress(bed type)    Mobility Interventions: Float heels    Nutrition Interventions: Document food/fluid/supplement intake    Friction and Shear Interventions: Apply protective barrier, creams and emollients

## 2018-06-20 NOTE — PROGRESS NOTES
Bedside shift change report given to Rudy Watts (oncoming nurse) by Georgie Kelly (offgoing nurse). Report included the following information SBAR.     1500 Notified Tammy Harrison NP that pt's last BP was 158/78. Pt has only had 50 mLs out of Schafer since this AM.  Pt to be bladder scanned. If bladder scan amount small, pt's output to continue to be monitored. Also, pt pocketing applesauce with meds.   Order received for speech eval.

## 2018-06-20 NOTE — PROGRESS NOTES
Problem: Falls - Risk of  Goal: *Absence of Falls  Document Selam Fall Risk and appropriate interventions in the flowsheet.    Outcome: Progressing Towards Goal  Fall Risk Interventions:       Mentation Interventions: Adequate sleep, hydration, pain control, Door open when patient unattended, More frequent rounding, Room close to nurse's station, Toileting rounds    Medication Interventions: Evaluate medications/consider consulting pharmacy    Elimination Interventions: Call light in reach, Patient to call for help with toileting needs, Toileting schedule/hourly rounds

## 2018-06-20 NOTE — PROGRESS NOTES
Hospitalist Progress Note  Chico Bucio NP  Answering service: [phone] OR 36 from in house phone  Cell: [phone]      Date of Service:  2018  NAME:  Mohan Grant  :  1940  MRN:  [id number]    Admission Summary:   Pt presented to the ED with fever. The patient was found to have temperature of 104 this morning at the [facility] facility - she was given tylenol which brought down her temp to 100.3. She also had an episode of vomiting/poor appetite and her urine was darker than usual.  EMS indicated that staff at [address] [place] [address] was concerned pt aspirated. Interval history / Subjective:      FMS output in last 24 hours is 800, adding questran    Hypernatremic again, start D50, pt not taking in enough water  Nursing to notify if bp not better controlled after morning med's    ADDENDUM:   1550 Discussed with pt's Tamica Servant he would like to continue current efforts and if patient is not progressing in a few days we will transition to comfort care. Pt has no other relatives around. Markel Arvizu is currently in [place] and will bring [employer] paperwork in on Monday. Assessment & Plan:     Severe sepsis (hypotension, fever, lactic acidosis, leukocytosis with UTI) POA:    - + pyuria. UA was turbid with + large leuks, WBC 20-50 and 4+ bacteria.  Culture pending.  - + watery liquid stool - c-diff NEGATIVE -> Flagyl stopped ()  - lactic acidosis resolved   - continue IV Rocephin (stop date ) for UTI    CAUTI, has long chronic mosquera, E-coli and Providenecia UTI - treated with 5 days of ceftriaxone    Hypokalemia:Resolved    Hypernatremia - improved, regressed  - stop d5 1/2 ns and switch to d5w with potassium  -  restarted D5w, pt not drinking enough water     Hx of hypertension:    -  added back lisinopril  - hydralazine prn    Diarrhea  - stool culture is negative, c-diff negative  - will need fecal fat and enzymes as outpatient if continues to analyze for malabsorption from pancreatic insufficiency  - lactose free diet  - immodium prn    Dehydration with elevated Creatinine: Resolved  - Cr now normal so will decrease IVF    Hx Brain injury with functional quadriplegia:    - Bed bound, Supportive care, reposition patient  - communicates with simple words    Chronic Schafer Catheter  - ED changed catheter per nursing report    BENNY - resolved  - 2/2 dehydration    Code status: DNR  DVT prophylaxis: Lovenox   Care Plan discussed with: delmar, nurse, attending  Disposition: pt in long term care  POA is Ethan Reddy, her nephew: [address] Problems  Never Reviewed          Codes Class Noted POA    * (Principal)Sepsis ([facility] [place] [address] ICD-10-CM: A41.9  ICD-9-CM: 038.9, 995.91  6/15/2018 Yes        Catheter-associated urinary tract infection ([facility] [place] [address] ICD-10-CM: T83.511A, N39.0  ICD-9-CM: 996.64, 599.0  6/15/2018 Yes        Diarrhea ICD-10-CM: R19.7  ICD-9-CM: 787.91  6/15/2018 Yes        Functional quadriplegia ([facility] [place] [address] ICD-10-CM: R53.2  ICD-9-CM: 780.72  6/15/2018 Yes            Review of Systems:   Alert. Answers in one/two words  Difficult to obtain      Vital Signs:    Last 24hrs VS reviewed since prior progress note. Most recent are:  Visit Vitals    BP (!) 181/94 (BP 1 Location: Left arm, BP Patient Position: At rest)    Pulse 92    Temp 99.3 °F (37.4 °C)    Resp 16    Ht 5' 2\" (1.575 m)    Wt 49.2 kg (108 lb 7.5 oz)    SpO2 98%    BMI 19.84 kg/m2       Intake/Output Summary (Last 24 hours) at 06/20/18 1021  Last data filed at 06/20/18 1018   Gross per 24 hour   Intake              100 ml   Output              750 ml   Net             -650 ml      Physical Examination:         Constitutional:  No acute distress, cooperative, pleasant    ENT:  Oral mucous membranes dry. Lower denture with built up plaque. Pt has a protruding lower jaw and lip. Resp:  CTA bilaterally. No wheezing.  No accessory muscle use and on RA   CV:  Regular rhythm, normal rate, no murmurs. GI:  Soft, non distended, NTP, normoactive bowel sounds. + Flexiseal in place    Musculoskeletal:  No edema, warm, 2+ pulses throughout    Neurologic:  Moves all extremities. AAOx2       Data Review:   Review and/or order of clinical lab test  Review and/or order of tests in the radiology section of CPT  Review and/or order of tests in the medicine section of [facility]    Labs:     Recent Labs      06/20/18   0426  06/18/18   0430   WBC  7.8  11.7*   HGB  11.4*  10.5*   HCT  34.4*  31.8*   PLT  178  155     Recent Labs      06/20/18   0426  06/19/18   0649  06/18/18   0349   NA  150*  145  148*   K  3.5  3.3*  3.0*   CL  117*  116*  118*   CO2  19*  19*  19*   BUN  14  14  24*   CREA  0.48*  0.52*  0.63   GLU  115*  129*  128*   CA  8.7  9.0  8.5   MG   --    --   1.9     No results for input(s): SGOT, GPT, ALT, AP, TBIL, TBILI, TP, ALB, GLOB, GGT, AML, LPSE in the last 72 hours. No lab exists for component: AMYP, HLPSE  No results for input(s): INR, PTP, APTT in the last 72 hours. No lab exists for component: INREXT, INREXT   No results for input(s): FE, TIBC, PSAT, FERR in the last 72 hours. No results found for: FOL, RBCF   No results for input(s): PH, PCO2, PO2 in the last 72 hours. No results for input(s): CPK, CKNDX, TROIQ in the last 72 hours.     No lab exists for component: CPKMB  No results found for: CHOL, CHOLX, CHLST, CHOLV, HDL, LDL, LDLC, DLDLP, TGLX, TRIGL, TRIGP, CHHD, CHHDX  Lab Results   Component Value Date/Time    Glucose (POC) 98 06/20/2018 06:35 AM    Glucose (POC) 125 (H) 06/19/2018 09:09 PM    Glucose (POC) 107 (H) 06/19/2018 04:12 PM    Glucose (POC) 162 (H) 06/19/2018 11:41 AM    Glucose (POC) 119 (H) 06/19/2018 05:49 AM     Lab Results   Component Value Date/Time    Color DARK YELLOW 06/15/2018 07:48 PM    Appearance TURBID (A) 06/15/2018 07:48 PM    Specific gravity 1.024 06/15/2018 07:48 PM    pH (UA) 5.0 06/15/2018 07:48 PM    Protein 30 (A) 06/15/2018 07:48 PM Glucose NEGATIVE  06/15/2018 07:48 PM    Ketone TRACE (A) 06/15/2018 07:48 PM    Urobilinogen 1.0 06/15/2018 07:48 PM    Nitrites NEGATIVE  06/15/2018 07:48 PM    Leukocyte Esterase LARGE (A) 06/15/2018 07:48 PM    Epithelial cells MODERATE (A) 06/15/2018 07:48 PM    Bacteria 4+ (A) 06/15/2018 07:48 PM    WBC 20-50 06/15/2018 07:48 PM    RBC 0-5 06/15/2018 07:48 PM     Medications Reviewed:     Current Facility-Administered Medications   Medication Dose Route Frequency    dextrose 5% infusion  50 mL/hr IntraVENous CONTINUOUS    cholestyramine-aspartame (QUESTRAN LIGHT) packet 4 g  4 g Oral BID WITH MEALS    potassium chloride (KAON 10%) 20 mEq/15 mL oral liquid 40 mEq  40 mEq Oral DAILY    lactobac ac& pc-s.therm-b.anim (RAYSHAWN Q/RISAQUAD)  1 Cap Oral DAILY    lisinopril (PRINIVIL, ZESTRIL) tablet 5 mg  5 mg Oral DAILY    pantoprazole (PROTONIX) 2 mg/mL oral suspension 40 mg  40 mg Oral ACB    hydrALAZINE (APRESOLINE) 20 mg/mL injection 10 mg  10 mg IntraVENous Q6H PRN    loperamide (IMODIUM) capsule 2 mg  2 mg Oral Q4H PRN    acetaminophen (TYLENOL) tablet 650 mg  650 mg Oral Q6H PRN    albuterol-ipratropium (DUO-NEB) 2.5 MG-0.5 MG/3 ML  3 mL Nebulization Q4H PRN    sodium chloride (NS) flush 5-10 mL  5-10 mL IntraVENous Q8H    sodium chloride (NS) flush 5-10 mL  5-10 mL IntraVENous PRN    naloxone (NARCAN) injection 0.4 mg  0.4 mg IntraVENous PRN    enoxaparin (LOVENOX) injection 40 mg  40 mg SubCUTAneous Q24H    ondansetron (ZOFRAN) injection 4 mg  4 mg IntraVENous Q4H PRN   ______________________________________________________________________  EXPECTED LENGTH OF STAY: 4d 21h  ACTUAL LENGTH OF STAY:          5               Kath Kelly V NP

## 2018-06-21 LAB
ANION GAP SERPL CALC-SCNC: 8 MMOL/L (ref 5–15)
BUN SERPL-MCNC: 12 MG/DL (ref 6–20)
BUN/CREAT SERPL: 30 (ref 12–20)
CALCIUM SERPL-MCNC: 8.2 MG/DL (ref 8.5–10.1)
CHLORIDE SERPL-SCNC: 117 MMOL/L (ref 97–108)
CO2 SERPL-SCNC: 22 MMOL/L (ref 21–32)
CREAT SERPL-MCNC: 0.4 MG/DL (ref 0.55–1.02)
GLUCOSE BLD STRIP.AUTO-MCNC: 108 MG/DL (ref 65–100)
GLUCOSE BLD STRIP.AUTO-MCNC: 110 MG/DL (ref 65–100)
GLUCOSE BLD STRIP.AUTO-MCNC: 112 MG/DL (ref 65–100)
GLUCOSE BLD STRIP.AUTO-MCNC: 124 MG/DL (ref 65–100)
GLUCOSE SERPL-MCNC: 105 MG/DL (ref 65–100)
MAGNESIUM SERPL-MCNC: 1.7 MG/DL (ref 1.6–2.4)
POTASSIUM SERPL-SCNC: 3 MMOL/L (ref 3.5–5.1)
SERVICE CMNT-IMP: ABNORMAL
SODIUM SERPL-SCNC: 147 MMOL/L (ref 136–145)

## 2018-06-21 PROCEDURE — 74011250637 HC RX REV CODE- 250/637: Performed by: NURSE PRACTITIONER

## 2018-06-21 PROCEDURE — 36415 COLL VENOUS BLD VENIPUNCTURE: CPT

## 2018-06-21 PROCEDURE — 82962 GLUCOSE BLOOD TEST: CPT

## 2018-06-21 PROCEDURE — 76450000000

## 2018-06-21 PROCEDURE — 83735 ASSAY OF MAGNESIUM: CPT

## 2018-06-21 PROCEDURE — 92526 ORAL FUNCTION THERAPY: CPT

## 2018-06-21 PROCEDURE — 65270000029 HC RM PRIVATE

## 2018-06-21 PROCEDURE — 74011250636 HC RX REV CODE- 250/636: Performed by: NURSE PRACTITIONER

## 2018-06-21 PROCEDURE — 74011250637 HC RX REV CODE- 250/637: Performed by: INTERNAL MEDICINE

## 2018-06-21 PROCEDURE — 80048 BASIC METABOLIC PNL TOTAL CA: CPT

## 2018-06-21 PROCEDURE — 74011250636 HC RX REV CODE- 250/636: Performed by: HOSPITALIST

## 2018-06-21 RX ORDER — POTASSIUM CHLORIDE 20MEQ/15ML
40 LIQUID (ML) ORAL ONCE
Status: ACTIVE | OUTPATIENT
Start: 2018-06-21 | End: 2018-06-22

## 2018-06-21 RX ORDER — MAGNESIUM SULFATE 100 %
4 CRYSTALS MISCELLANEOUS AS NEEDED
Status: DISCONTINUED | OUTPATIENT
Start: 2018-06-21 | End: 2018-06-27 | Stop reason: HOSPADM

## 2018-06-21 RX ORDER — MAGNESIUM SULFATE HEPTAHYDRATE 40 MG/ML
2 INJECTION, SOLUTION INTRAVENOUS ONCE
Status: COMPLETED | OUTPATIENT
Start: 2018-06-21 | End: 2018-06-21

## 2018-06-21 RX ORDER — DEXTROSE 50 % IN WATER (D50W) INTRAVENOUS SYRINGE
12.5-25 AS NEEDED
Status: DISCONTINUED | OUTPATIENT
Start: 2018-06-21 | End: 2018-06-27 | Stop reason: HOSPADM

## 2018-06-21 RX ORDER — SCOLOPAMINE TRANSDERMAL SYSTEM 1 MG/1
1 PATCH, EXTENDED RELEASE TRANSDERMAL
Status: DISCONTINUED | OUTPATIENT
Start: 2018-06-21 | End: 2018-06-27 | Stop reason: HOSPADM

## 2018-06-21 RX ADMIN — ACETAMINOPHEN 650 MG: 325 TABLET ORAL at 17:36

## 2018-06-21 RX ADMIN — DEXTROSE MONOHYDRATE 50 ML/HR: 5 INJECTION, SOLUTION INTRAVENOUS at 22:31

## 2018-06-21 RX ADMIN — ENOXAPARIN SODIUM 40 MG: 100 INJECTION SUBCUTANEOUS at 22:26

## 2018-06-21 RX ADMIN — Medication 10 ML: at 06:55

## 2018-06-21 RX ADMIN — DEXTROSE MONOHYDRATE 50 ML/HR: 5 INJECTION, SOLUTION INTRAVENOUS at 03:17

## 2018-06-21 RX ADMIN — Medication 1 CAPSULE: at 09:45

## 2018-06-21 RX ADMIN — MAGNESIUM SULFATE HEPTAHYDRATE 2 G: 40 INJECTION, SOLUTION INTRAVENOUS at 12:42

## 2018-06-21 RX ADMIN — Medication 10 ML: at 22:26

## 2018-06-21 RX ADMIN — LISINOPRIL 5 MG: 5 TABLET ORAL at 09:45

## 2018-06-21 RX ADMIN — PANTOPRAZOLE SODIUM 40 MG: 40 TABLET, DELAYED RELEASE ORAL at 06:55

## 2018-06-21 RX ADMIN — Medication 10 ML: at 14:52

## 2018-06-21 NOTE — PROGRESS NOTES
Bedside shift change report given to Christianne RN (oncoming nurse) by Citlaly Bledsoe RN (offgoing nurse). Report included the following information SBAR, Kardex, ED Summary, [facility] and Recent Results.

## 2018-06-21 NOTE — PALLIATIVE CARE
Patient is a 68year old woman with a history significant for HTN, DM2, demeylinating condition of CNS, brain injury; dysphagia, functional quadraplegia with contractures and urinary retention with chronic mosquera. Per chart, she has been bed-bound for some time and LTC for 10+ years. She was admitted from [facility] on 6/15 with fever of 104, vomiting and some concern she had aspirated. Patient being treated for Urinary Sepsis. Speech has assessed high risk for aspiration and patient currently NPO. Chart indicates that her nephew, Chela Rubalcava ([phone]) has mPOA. He is currently in [place] and expected to return to [place] by Sunday. He has stated a desire to continue with current level of care, but open to a shift to comfort if her condition worsens or determined she cannot recover meaningfully. Patient has DNR in place. Our team will follow-up on patient tomorrow.

## 2018-06-21 NOTE — PROGRESS NOTES
Patient approved to return to facility when medically ready for discharge.   Ave Cordova, JOSE DANIELW,CRM

## 2018-06-21 NOTE — PROGRESS NOTES
NUTRITION  Pt seen for:     [x]           Supplements  [x]             PO intake check           RECOMMENDATIONS:   1. Continue to assist with current diet when alert and upright  2. Use supplements to give oral medications  3. Add daily MVI  SUBJECTIVE/OBJECTIVE:   Chart reviewed for poor PO intake/at nutrition risk. Seen by SLP today. At risk with all consistencies due to positioning and requirement for feeding, Note pt ok to continue to offer bites of puree or teaspoon amounts of nectar thick liquid ONLY if patient FULLY alert, UPRIGHT and actively accepting, otherwise hold. Palliative following. Has spoken with Avery via phone. Planning on continue with current care for now, \"but open to a shift to comfort if her condition worsens. \" mPOCELY will be coming to [place] on Sunday. Per MD notes mPOA \"would not want a peg tube, agree with this. \"     Pt visited today but able to provide only minimal information. Spoke with PCT who has been assisting with meals. Ate only the puree fruit at breakfast and minimal intake at lunch as well. Earlier this week spoke with facility staff who note pt getting \"House Shakes\" and Magic Cup there. Added supplements earlier this week to provide: 730kcal, 27g protein. Will continue to follow for goals of care but with no plans for PEG tube minimal nutrition intervention available at this time. Could consider trial of appetite stimulant. Continue to assist with feeding and encouragement with all meals. Diet:  Puree, nectar-thick  Supplements: Magic Cup, Ensure Compact BID  Intake:   [x]           Poor   Patient Vitals for the past 100 hrs:   % Diet Eaten   06/21/18 0918 10 %   06/20/18 1900 0 %   06/20/18 1018 30 %   06/19/18 1005 5 %   06/18/18 1927 10 %   06/18/18 1318 20 %     Weight Changes: Wt Readings from Last 10 Encounters:   06/17/18 49.2 kg (108 lb 7.5 oz)     Nutrition Diagnosis:   1.  Inadequate protein-energy intake related to poor appetite, self-feeding difficulty as evidenced by less than 25% meals consumed; total feed    2. Swallowing difficulty related to dysphagia as evidenced by puree, nectar-thick; high risk for aspiration    Estimated Nutrition Needs:   Kcals/day: 1107 Kcals/day (1107-1200kcal)  Protein: 60 g (60-70g (1.2-1.4g/kg))  Fluid: 1250 ml (25ml/kg)  Based On:  [place] [address] (x 1.2-1.3)  Weight Used: Actual wt (49.2kg)    PLAN:   [x]           Obtained/adjusted food preferences/tolerances and/or snacks options   [x]           Follow per protocol  [x]           Continue Supplements    Rescreen:  [x]            At Nutrition Risk - minimal nutrition intervention available          []            Not at Nutrition Risk, rescreen per screening protocol    Lalitha Hunter RD

## 2018-06-21 NOTE — PROGRESS NOTES
Problem: Pressure Injury - Risk of  Goal: *Prevention of pressure injury  Document Refugio Scale and appropriate interventions in the flowsheet    Air mattress  Offload heels with pillows  Barrier cream.    Outcome: Progressing Towards Goal  Pressure Injury Interventions:  Sensory Interventions: Assess changes in LOC, Avoid rigorous massage over bony prominences, Check visual cues for pain, Keep linens dry and wrinkle-free, Float heels, Minimize linen layers, Turn and reposition approx. every two hours (pillows and wedges if needed)    Moisture Interventions: Minimize layers, Maintain skin hydration (lotion/cream)    Activity Interventions: Increase time out of bed, Pressure redistribution bed/mattress(bed type)    Mobility Interventions: Float heels, HOB 30 degrees or less, Pressure redistribution bed/mattress (bed type), Turn and reposition approx.  every two hours(pillow and wedges)    Nutrition Interventions: Document food/fluid/supplement intake, Offer support with meals,snacks and hydration    Friction and Shear Interventions: HOB 30 degrees or less, Lift sheet, Minimize layers

## 2018-06-21 NOTE — PROGRESS NOTES
Bedside and Verbal shift change report given to Lizet Knapp (oncoming nurse) by Christianne (offgoing nurse). Report included the following information SBAR, Kardex, Intake/Output, MAR and Recent Results.

## 2018-06-21 NOTE — PROGRESS NOTES
Problem: Falls - Risk of  Goal: *Absence of Falls  Document Selam Fall Risk and appropriate interventions in the flowsheet.    Outcome: Progressing Towards Goal  Fall Risk Interventions:       Mentation Interventions: Adequate sleep, hydration, pain control, Door open when patient unattended, Room close to nurse's station, More frequent rounding, Toileting rounds    Medication Interventions: Evaluate medications/consider consulting pharmacy    Elimination Interventions: Call light in reach

## 2018-06-21 NOTE — PROGRESS NOTES
Problem: Dysphagia (Adult)  Goal: *Acute Goals and Plan of Care (Insert Text)  Speech Therapy Goals  Initiated 6/20/2018  1. Patient will tolerate dysphagia 1 diet with nectar thick liquids without signs/symptoms of aspiration within 7day(s). 2.  Patient will tolerate trials of thin liquids with SLP within 7 days. Discontinued 6/21/2018   Speech language pathology dysphagia treatment  Patient: Jese Swo (12 y.o. female)  Date: 6/21/2018  Diagnosis: Sepsis ([facility] [address] Sepsis ([facility] [address]       Precautions:     ASSESSMENT:  RN reporting inability to take meds crushed this am. Patient awake on SLP arrival with copious pooled secretions from R side due to weak labial seal and positioning. Patient did actively accept several bites of puree and tsp of nectar thick liquids. Prolonged bolus manipulation, delayed posterior propulsion and mild lingual residue along with secretions. Pharyngeal swallow initiation is suspected to be delayed and hyolaryngeal elevation/excursion reduced via palpation. No overt s/s aspiration with scant amounts of PO. While patient accepted and no overt s/s aspiration observed, patient remains at high risk of aspiration with all PO given dependence for feeding, mentation, positioning and difficulty managing own secretions. PO intake is not functional and she will not be able to meet nutritional needs via PO. Progression toward goals:  []         Improving appropriately and progressing toward goals  [x]         Improving slowly and progressing toward goals  []         Not making progress toward goals and plan of care will be adjusted     PLAN:  Recommendations and Planned Interventions:  -- ok to continue to offer bites of puree or teaspoon amounts of nectar thick liquid ONLY if patient FULLY alert, UPRIGHT and actively accepting, otherwise hold. -- further discussion regarding goals of care. Non-functional oral intake to meet nutritional needs. Patient appears appropriate for comfort measures. Patient continues to benefit from skilled intervention to address the above impairments. Continue treatment per established plan of care. Discharge Recommendations:  None     SUBJECTIVE:   Patient with low volume and unintelligible speech    OBJECTIVE:   Cognitive and Communication Status:  Neurologic State: Alert, Confused  Orientation Level: Oriented to person, Oriented to place  Cognition: Follows commands  Perception: Appears intact  Perseveration: No perseveration noted     Dysphagia Treatment:  Oral Assessment:  Oral Assessment  Labial: Decreased rate;Decreased seal  Dentition: Natural;Extractions  Oral Hygiene:  (copious pooled intra oral secretions on R side)  Lingual: Decreased rate  Velum: Unable to visualize  Mandible: Restricted  P.O. Trials:  Patient Position:  (up in bed)  Vocal quality prior to P.O.: Low volume  Consistency Presented: Nectar thick liquid;Puree  How Presented: Spoon;SLP-fed/presented     Bolus Acceptance: Impaired  Bolus Formation/Control: Impaired  Type of Impairment: Drooling; Anterior (profuse amounts of drooling from )  Propulsion: Delayed (# of seconds)  Oral Residue: Less than 10% of bolus; Right  Initiation of Swallow: Delayed (# of seconds)  Laryngeal Elevation: Decreased  Aspiration Signs/Symptoms: None     Effective Modifications: None          Oral Phase Severity: Moderate-severe  Pharyngeal Phase Severity : Moderate     Pain:  Pain Scale 1: Numeric (0 - 10)  Pain Intensity 1: 0     After treatment:   []              Patient left in no apparent distress sitting up in chair  [x]              Patient left in no apparent distress in bed  [x]              Call bell left within reach  [x]              Nursing notified  []              Caregiver present  []              Bed alarm activated    COMMUNICATION/EDUCATION:     The patients plan of care including recommendations, planned interventions, and recommended diet changes were discussed with: Registered Nurse and NP.    Karie Benedict, SLP  Time Calculation: 14 mins

## 2018-06-21 NOTE — PROGRESS NOTES
Hospitalist Progress Note  Reginald Andrade NP  Answering service: [phone] from in house phone  Cell: [phone]      Date of Service:  2018  NAME:  Heidi Hatfield  :  1940  MRN:  [id number]    Admission Summary:   Pt presented to the ED with fever. The patient was found to have temperature of 104 this morning at the [facility] facility - she was given tylenol which brought down her temp to 100.3. She also had an episode of vomiting/poor appetite and her urine was darker than usual.  EMS indicated that staff at [address] [place] [address] was concerned pt aspirated. Interval history / Subjective:      FMS output in last 24 hours is 50    Still with diarrhea  Replete potassium, magnesium  Na improved to 147, c/w D50, pt not taking in much nutrition  Encourage water if able  bp better this am  Palliative consult, Eun Diaz will bring papers on Monday. He returns from [place] on . Assessment & Plan:     Severe sepsis (hypotension, fever, lactic acidosis, leukocytosis with UTI) POA:    - + pyuria. UA was turbid with + large leuks, WBC 20-50 and 4+ bacteria.  Culture pending.  - + watery liquid stool - c-diff NEGATIVE -> Flagyl stopped ()  - lactic acidosis resolved   - continue IV Rocephin (stop date ) for UTI    Dysphagia  - speech evaluated  - puree diet with nectar thick liquids  - scopalamine patch for secretions  - discussed with medical poa would not want a peg tube, agree with this    CAUTI, has long chronic mosquera, E-coli and Providenecia UTI - treated with 5 days of ceftriaxone    Hypokalemia:Replete    Hypernatremia - improved, regressed  - stop d5 1/2 ns and switch to d5w with potassium  -  restarted D5w, pt not drinking enough water   -  improving    Hx of hypertension:    -  added back lisinopril  - hydralazine prn    Diarrhea  - stool culture is negative, c-diff negative  - will need fecal fat and enzymes as outpatient if continues to analyze for malabsorption from pancreatic insufficiency  - lactose free diet  - immodium prn    Dehydration with elevated Creatinine: Resolved  - Cr now normal so will decrease IVF    Hx Brain injury with functional quadriplegia:    - Bed bound, Supportive care, reposition patient  - communicates with simple words    Chronic Schafer Catheter  - ED changed catheter per nursing report    BENNY - resolved  - 2/2 dehydration    Code status: DNR  DVT prophylaxis: Lovenox   Care Plan discussed with: delmar, nurse, attending  Disposition: pt in long term care  POA is Nehemias Patricia, her nephew: [phone]    6/21 Discussed with pt's Eun Diaz he would like to continue current efforts and if patient is not progressing in a few days we will transition to comfort care. Pt has no other relatives around. Nehemias Patricia is currently in [place] and will bring MPOA paperwork in on Monday. Hospital Problems  Never Reviewed          Codes Class Noted POA    * (Principal)Sepsis [facility]) ICD-10-CM: A41.9  ICD-9-CM: 038.9, 995.91  6/15/2018 Yes        Catheter-associated urinary tract infection ([facility] [address] ICD-10-CM: T83.511A, N39.0  ICD-9-CM: 996.64, 599.0  6/15/2018 Yes        Diarrhea ICD-10-CM: R19.7  ICD-9-CM: 787.91  6/15/2018 Yes        Functional quadriplegia ([facility] [address] ICD-10-CM: R53.2  ICD-9-CM: 780.72  6/15/2018 Yes            Review of Systems:   Alert. Answers in one/two words  Difficult to obtain      Vital Signs:    Last 24hrs VS reviewed since prior progress note.  Most recent are:  Visit Vitals    /75 (BP 1 Location: Left arm, BP Patient Position: At rest)    Pulse 78    Temp 99.4 °F (37.4 °C)    Resp 16    Ht 5' 2\" (1.575 m)    Wt 49.2 kg (108 lb 7.5 oz)    SpO2 100%    BMI 19.84 kg/m2       Intake/Output Summary (Last 24 hours) at 06/21/18 1204  Last data filed at 06/21/18 0857   Gross per 24 hour   Intake           1051.4 ml   Output              700 ml   Net            351.4 ml      Physical Examination: Constitutional:  No acute distress, cooperative, pleasant    ENT:  Oral mucous membranes dry. Lower denture with built up plaque. Pt has a protruding lower jaw and lip. Resp:  CTA bilaterally. No wheezing. No accessory muscle use and on RA   CV:  Regular rhythm, normal rate, no murmurs. GI:  Soft, non distended, NTP, normoactive bowel sounds. + Flexiseal in place    Musculoskeletal:  No edema, warm, 2+ pulses throughout    Neurologic:  Moves all extremities. AAOx2       Data Review:   Review and/or order of clinical lab test  Review and/or order of tests in the radiology section of CPT  Review and/or order of tests in the medicine section of CPT    Labs:     Recent Labs      06/20/18   0426   WBC  7.8   HGB  11.4*   HCT  34.4*   PLT  178     Recent Labs      06/21/18   0530  06/20/18   0426  06/19/18   0649   NA  147*  150*  145   K  3.0*  3.5  3.3*   CL  117*  117*  116*   CO2  22  19*  19*   BUN  12  14  14   CREA  0.40*  0.48*  0.52*   GLU  105*  115*  129*   CA  8.2*  8.7  9.0   MG  1.7   --    --      No results for input(s): SGOT, GPT, ALT, AP, TBIL, TBILI, TP, ALB, GLOB, GGT, AML, LPSE in the last 72 hours. No lab exists for component: AMYP, HLPSE  No results for input(s): INR, PTP, APTT in the last 72 hours. No lab exists for component: INREXT, INREXT   No results for input(s): FE, TIBC, PSAT, FERR in the last 72 hours. No results found for: FOL, RBCF   No results for input(s): PH, PCO2, PO2 in the last 72 hours. No results for input(s): CPK, CKNDX, TROIQ in the last 72 hours.     No lab exists for component: CPKMB  No results found for: CHOL, CHOLX, CHLST, CHOLV, HDL, LDL, LDLC, DLDLP, TGLX, TRIGL, TRIGP, CHHD, CHHDX  Lab Results   Component Value Date/Time    Glucose (POC) 112 (H) 06/21/2018 11:14 AM    Glucose (POC) 110 (H) 06/21/2018 06:35 AM    Glucose (POC) 129 (H) 06/20/2018 09:11 PM    Glucose (POC) 131 (H) 06/20/2018 04:25 PM    Glucose (POC) 150 (H) 06/20/2018 11:15 AM Lab Results   Component Value Date/Time    Color DARK YELLOW 06/15/2018 07:48 PM    Appearance TURBID (A) 06/15/2018 07:48 PM    Specific gravity 1.024 06/15/2018 07:48 PM    pH (UA) 5.0 06/15/2018 07:48 PM    Protein 30 (A) 06/15/2018 07:48 PM    Glucose NEGATIVE  06/15/2018 07:48 PM    Ketone TRACE (A) 06/15/2018 07:48 PM    Urobilinogen 1.0 06/15/2018 07:48 PM    Nitrites NEGATIVE  06/15/2018 07:48 PM    Leukocyte Esterase LARGE (A) 06/15/2018 07:48 PM    Epithelial cells MODERATE (A) 06/15/2018 07:48 PM    Bacteria 4+ (A) 06/15/2018 07:48 PM    WBC 20-50 06/15/2018 07:48 PM    RBC 0-5 06/15/2018 07:48 PM     Medications Reviewed:     Current Facility-Administered Medications   Medication Dose Route Frequency    potassium chloride (KAON 10%) 20 mEq/15 mL oral liquid 40 mEq  40 mEq Oral ONCE    magnesium sulfate 2 g/50 ml IVPB (premix or compounded)  2 g IntraVENous ONCE    dextrose 5% infusion  50 mL/hr IntraVENous CONTINUOUS    cholestyramine-aspartame (QUESTRAN LIGHT) packet 4 g  4 g Oral BID WITH MEALS    potassium chloride (KAON 10%) 20 mEq/15 mL oral liquid 40 mEq  40 mEq Oral DAILY    lactobac ac& pc-s.therm-b.anim (RAYSHAWN Q/RISAQUAD)  1 Cap Oral DAILY    lisinopril (PRINIVIL, ZESTRIL) tablet 5 mg  5 mg Oral DAILY    pantoprazole (PROTONIX) 2 mg/mL oral suspension 40 mg  40 mg Oral ACB    hydrALAZINE (APRESOLINE) 20 mg/mL injection 10 mg  10 mg IntraVENous Q6H PRN    loperamide (IMODIUM) capsule 2 mg  2 mg Oral Q4H PRN    acetaminophen (TYLENOL) tablet 650 mg  650 mg Oral Q6H PRN    albuterol-ipratropium (DUO-NEB) 2.5 MG-0.5 MG/3 ML  3 mL Nebulization Q4H PRN    sodium chloride (NS) flush 5-10 mL  5-10 mL IntraVENous Q8H    sodium chloride (NS) flush 5-10 mL  5-10 mL IntraVENous PRN    naloxone (NARCAN) injection 0.4 mg  0.4 mg IntraVENous PRN    enoxaparin (LOVENOX) injection 40 mg  40 mg SubCUTAneous Q24H    ondansetron (ZOFRAN) injection 4 mg  4 mg IntraVENous Q4H PRN ______________________________________________________________________  EXPECTED LENGTH OF STAY: 4d 21h  ACTUAL LENGTH OF STAY:          6               Kath Kelly V NP

## 2018-06-22 LAB
ANION GAP SERPL CALC-SCNC: 7 MMOL/L (ref 5–15)
BUN SERPL-MCNC: 9 MG/DL (ref 6–20)
BUN/CREAT SERPL: 21 (ref 12–20)
CALCIUM SERPL-MCNC: 8.5 MG/DL (ref 8.5–10.1)
CHLORIDE SERPL-SCNC: 110 MMOL/L (ref 97–108)
CO2 SERPL-SCNC: 23 MMOL/L (ref 21–32)
CREAT SERPL-MCNC: 0.42 MG/DL (ref 0.55–1.02)
GLUCOSE BLD STRIP.AUTO-MCNC: 106 MG/DL (ref 65–100)
GLUCOSE BLD STRIP.AUTO-MCNC: 109 MG/DL (ref 65–100)
GLUCOSE BLD STRIP.AUTO-MCNC: 110 MG/DL (ref 65–100)
GLUCOSE BLD STRIP.AUTO-MCNC: 124 MG/DL (ref 65–100)
GLUCOSE SERPL-MCNC: 124 MG/DL (ref 65–100)
POTASSIUM SERPL-SCNC: 2.7 MMOL/L (ref 3.5–5.1)
SERVICE CMNT-IMP: ABNORMAL
SODIUM SERPL-SCNC: 140 MMOL/L (ref 136–145)

## 2018-06-22 PROCEDURE — 74011250636 HC RX REV CODE- 250/636: Performed by: HOSPITALIST

## 2018-06-22 PROCEDURE — 74011250637 HC RX REV CODE- 250/637: Performed by: NURSE PRACTITIONER

## 2018-06-22 PROCEDURE — 36415 COLL VENOUS BLD VENIPUNCTURE: CPT

## 2018-06-22 PROCEDURE — 74011250636 HC RX REV CODE- 250/636: Performed by: NURSE PRACTITIONER

## 2018-06-22 PROCEDURE — 65270000029 HC RM PRIVATE

## 2018-06-22 PROCEDURE — 82962 GLUCOSE BLOOD TEST: CPT

## 2018-06-22 PROCEDURE — 80048 BASIC METABOLIC PNL TOTAL CA: CPT

## 2018-06-22 RX ORDER — POTASSIUM CHLORIDE 14.9 MG/ML
10 INJECTION INTRAVENOUS
Status: COMPLETED | OUTPATIENT
Start: 2018-06-22 | End: 2018-06-22

## 2018-06-22 RX ADMIN — POTASSIUM CHLORIDE 10 MEQ: 200 INJECTION, SOLUTION INTRAVENOUS at 08:17

## 2018-06-22 RX ADMIN — POTASSIUM CHLORIDE 10 MEQ: 200 INJECTION, SOLUTION INTRAVENOUS at 12:10

## 2018-06-22 RX ADMIN — POTASSIUM CHLORIDE 10 MEQ: 200 INJECTION, SOLUTION INTRAVENOUS at 10:11

## 2018-06-22 RX ADMIN — LISINOPRIL 5 MG: 5 TABLET ORAL at 08:21

## 2018-06-22 RX ADMIN — Medication 10 ML: at 23:30

## 2018-06-22 RX ADMIN — Medication 1 CAPSULE: at 08:21

## 2018-06-22 RX ADMIN — POTASSIUM CHLORIDE 10 MEQ: 200 INJECTION, SOLUTION INTRAVENOUS at 14:00

## 2018-06-22 RX ADMIN — LOPERAMIDE HYDROCHLORIDE 2 MG: 1 SOLUTION ORAL at 09:35

## 2018-06-22 RX ADMIN — ENOXAPARIN SODIUM 40 MG: 100 INJECTION SUBCUTANEOUS at 23:29

## 2018-06-22 RX ADMIN — Medication 10 ML: at 06:04

## 2018-06-22 RX ADMIN — ACETAMINOPHEN 650 MG: 325 TABLET ORAL at 08:22

## 2018-06-22 RX ADMIN — HYDRALAZINE HYDROCHLORIDE 10 MG: 20 INJECTION INTRAMUSCULAR; INTRAVENOUS at 23:30

## 2018-06-22 RX ADMIN — Medication 10 ML: at 15:16

## 2018-06-22 RX ADMIN — CHOLESTYRAMINE 4 G: 4 POWDER, FOR SUSPENSION ORAL at 17:49

## 2018-06-22 NOTE — PROGRESS NOTES
Bedside and Verbal shift change report given to Shelby RN (oncoming nurse) by Corey Woods RN (offgoing nurse). Report included the following information SBAR, Kardex, Procedure Summary, Intake/Output, MAR, Recent Results and Med Rec Status.

## 2018-06-22 NOTE — PROGRESS NOTES
Bedside and Verbal shift change report given to Omar Shepherd (oncoming nurse) by Elise Ruano (offgoing nurse). Report included the following information SBAR, Kardex, MAR and Recent Results. 0 Pt states she is having discomfort around her IV site. Potassium to infuse over 2 hours to minimize discomfort.

## 2018-06-22 NOTE — PALLIATIVE CARE
Palliative Medicine Social Work    Visited with patient this date. She was alert with pleasant smile and able to engage; speech is difficult to interpret. She communicated she was feeling \"bad\"; could not elaborate why or if she was hurting anywhere. Nursing reports she was likely having some pain with infusion of potassium. Patient also likely with baseline discomfort, given her contractures. She did ask to be turned during my visit. She denies any worries. Informed her of plan to re-convene on Monday with her nephew to figure out some plans for her care. She reports he is her only family and that she trusts him. Thank you for the opportunity to be involved in the care of Ms. Isiah Anne. Elizabeth Alfonso, DOIRW, [facility]-  Palliative Medicine   [employer] ® ACP Facilitator   [phone]

## 2018-06-22 NOTE — PROGRESS NOTES
Hospitalist Progress Note  Elvira Alegria NP  Answering service: [phone] OR [phone] from in house phone  Cell: [phone]      Date of Service:  2018  NAME:  Felecia Lin  :  1940  MRN:  [id number]    Admission Summary:   Pt presented to the ED with fever. The patient was found to have temperature of 104 this morning at the [facility] facility - she was given tylenol which brought down her temp to 100.3. She also had an episode of vomiting/poor appetite and her urine was darker than usual.  EMS indicated that staff at [address] [place] [address] was concerned pt aspirated. Interval history / Subjective:      FMS output in last 24 hours is 250    Replete potassium  Immodium now  Na improved, decrease d5w   PCT trying to fee patient. Pt continues with poor po intake, it is very likely that we are headed towards comfort care  Palliative to follow up with nephew in law, needs to bring MPOA papers. He's returning on  from [address]:     Severe sepsis (hypotension, fever, lactic acidosis, leukocytosis with UTI) POA:  Resolved  - + pyuria. UA was turbid with + large leuks, WBC 20-50 and 4+ bacteria.  Culture pending.  - + watery liquid stool - c-diff NEGATIVE -> Flagyl stopped ()  - lactic acidosis resolved   - continue IV Rocephin (stop date ) for UTI    Dysphagia  - speech evaluated  - puree diet with nectar thick liquids  - scopalamine patch for secretions  - discussed with medical poa would not want a peg tube, agree with this    CAUTI, has long chronic mosquera, E-coli and Providenecia UTI - treated with 5 days of ceftriaxone    Hypokalemia:Replete, monitor    Hypernatremia - improved, regressed, improved   - stop d5 1/2 ns and switch to d5w with potassium  -  restarted D5w, pt not drinking enough water   -  improving    Hx of hypertension:    -  added back lisinopril  - hydralazine prn    Diarrhea  - stool culture is negative, c-diff negative  - will need fecal fat and enzymes as outpatient if continues to analyze for malabsorption from pancreatic insufficiency  - lactose free diet  - immodium prn    Dehydration with elevated Creatinine: Resolved  - Cr now normal so will decrease IVF    Hx Brain injury with functional quadriplegia:    - Bed bound, Supportive care, reposition patient  - communicates with simple words    Failure to Thrive  - BMI 19.84, normal weight, but decreased intake 2/2 dysphagia, TBI, consult nutrition    Chronic Schafer Catheter  - ED changed catheter per nursing report    BENNY - resolved  - 2/2 dehydration    Code status: DNR  DVT prophylaxis: Lovenox   Care Plan discussed with: delmar, nurse, attending  Disposition: pt in long term care  POA is Mary Jo Lynch, her nephew: [phone]    6/21 Discussed with pt's Ayush Sawyer he would like to continue current efforts and if patient is not progressing in a few days we will transition to comfort care. Pt has no other relatives around. Mary Jo Lynch is currently in [place] and will bring MPOA paperwork in on Monday. Hospital Problems  Never Reviewed          Codes Class Noted POA    * (Principal)Sepsis [facility]) ICD-10-CM: A41.9  ICD-9-CM: 038.9, 995.91  6/15/2018 Yes        Catheter-associated urinary tract infection ([facility] [address] ICD-10-CM: T83.511A, N39.0  ICD-9-CM: 996.64, 599.0  6/15/2018 Yes        Diarrhea ICD-10-CM: R19.7  ICD-9-CM: 787.91  6/15/2018 Yes        [facility] ICD-10-CM: R53.2  ICD-9-CM: 780.72  6/15/2018 Yes            Review of Systems:   Alert. Answers in one/two words  Difficult to obtain      Vital Signs:    Last 24hrs VS reviewed since prior progress note.  Most recent are:  Visit Vitals    /69 (BP 1 Location: Right arm, BP Patient Position: At rest)    Pulse 72    Temp 99.2 °F (37.3 °C)    Resp 20    Ht 5' 2\" (1.575 m)    Wt 49.2 kg (108 lb 7.5 oz)    SpO2 100%    BMI 19.84 kg/m2       Intake/Output Summary (Last 24 hours) at 06/22/18 0857  Last data filed at 06/22/18 2640   Gross per 24 hour   Intake             1145 ml   Output               50 ml   Net             1095 ml      Physical Examination:         Constitutional:  No acute distress, cooperative, pleasant    ENT:  Oral mucous membranes dry. Lower teeth with thick plaque. Pt has a protruding lower jaw and lip. Resp:  CTA bilaterally. No wheezing. No accessory muscle use and on RA   CV:  Regular rhythm, normal rate, no murmurs. GI:  Soft, non distended, NTP, normoactive bowel sounds. + Flexiseal in place    Musculoskeletal:  No edema, warm, 2+ pulses throughout    Neurologic:  Moves all extremities. AAOx2, able to verbalize one to two words at baseline       Data Review:   Review and/or order of clinical lab test  Review and/or order of tests in the radiology section of CPT  Review and/or order of tests in the medicine section of CPT    Labs:     Recent Labs      06/20/18   0426   WBC  7.8   HGB  11.4*   HCT  34.4*   PLT  178     Recent Labs      06/22/18   0607  06/21/18   0530  06/20/18   0426   NA  140  147*  150*   K  2.7*  3.0*  3.5   CL  110*  117*  117*   CO2  23  22  19*   BUN  9  12  14   CREA  0.42*  0.40*  0.48*   GLU  124*  105*  115*   CA  8.5  8.2*  8.7   MG   --   1.7   --      No results for input(s): SGOT, GPT, ALT, AP, TBIL, TBILI, TP, ALB, GLOB, GGT, AML, LPSE in the last 72 hours. No lab exists for component: AMYP, HLPSE  No results for input(s): INR, PTP, APTT in the last 72 hours. No lab exists for component: INREXT, INREXT   No results for input(s): FE, TIBC, PSAT, FERR in the last 72 hours. No results found for: FOL, RBCF   No results for input(s): PH, PCO2, PO2 in the last 72 hours. No results for input(s): CPK, CKNDX, TROIQ in the last 72 hours.     No lab exists for component: CPKMB  No results found for: CHOL, CHOLX, CHLST, CHOLV, HDL, LDL, LDLC, DLDLP, TGLX, TRIGL, TRIGP, CHHD, CHHDX  Lab Results   Component Value Date/Time Glucose (POC) 109 (H) 06/22/2018 06:28 AM    Glucose (POC) 124 (H) 06/21/2018 09:11 PM    Glucose (POC) 108 (H) 06/21/2018 03:59 PM    Glucose (POC) 112 (H) 06/21/2018 11:14 AM    Glucose (POC) 110 (H) 06/21/2018 06:35 AM     Lab Results   Component Value Date/Time    Color DARK YELLOW 06/15/2018 07:48 PM    Appearance TURBID (A) 06/15/2018 07:48 PM    Specific gravity 1.024 06/15/2018 07:48 PM    pH (UA) 5.0 06/15/2018 07:48 PM    Protein 30 (A) 06/15/2018 07:48 PM    Glucose NEGATIVE  06/15/2018 07:48 PM    Ketone TRACE (A) 06/15/2018 07:48 PM    Urobilinogen 1.0 06/15/2018 07:48 PM    Nitrites NEGATIVE  06/15/2018 07:48 PM    Leukocyte Esterase LARGE (A) 06/15/2018 07:48 PM    Epithelial cells MODERATE (A) 06/15/2018 07:48 PM    Bacteria 4+ (A) 06/15/2018 07:48 PM    WBC 20-50 06/15/2018 07:48 PM    RBC 0-5 06/15/2018 07:48 PM     Medications Reviewed:     Current Facility-Administered Medications   Medication Dose Route Frequency    potassium chloride 10 mEq in 50 ml IVPB  10 mEq IntraVENous Q1H    scopolamine (TRANSDERM-SCOP) 1 mg over 3 days 1 Patch  1 Patch TransDERmal Q72H    glucose chewable tablet 16 g  4 Tab Oral PRN    dextrose (D50W) injection syrg 12.5-25 g  12.5-25 g IntraVENous PRN    glucagon (GLUCAGEN) injection 1 mg  1 mg IntraMUSCular PRN    dextrose 5% infusion  25 mL/hr IntraVENous CONTINUOUS    cholestyramine-aspartame (QUESTRAN LIGHT) packet 4 g  4 g Oral BID WITH MEALS    potassium chloride (KAON 10%) 20 mEq/15 mL oral liquid 40 mEq  40 mEq Oral DAILY    lactobac ac& pc-s.therm-b.anim (RAYSHAWN Q/RISAQUAD)  1 Cap Oral DAILY    lisinopril (PRINIVIL, ZESTRIL) tablet 5 mg  5 mg Oral DAILY    pantoprazole (PROTONIX) 2 mg/mL oral suspension 40 mg  40 mg Oral ACB    hydrALAZINE (APRESOLINE) 20 mg/mL injection 10 mg  10 mg IntraVENous Q6H PRN    loperamide (IMODIUM) capsule 2 mg  2 mg Oral Q4H PRN    acetaminophen (TYLENOL) tablet 650 mg  650 mg Oral Q6H PRN    albuterol-ipratropium (DUO-NEB) 2.5 MG-0.5 MG/3 ML  3 mL Nebulization Q4H PRN    sodium chloride (NS) flush 5-10 mL  5-10 mL IntraVENous Q8H    sodium chloride (NS) flush 5-10 mL  5-10 mL IntraVENous PRN    naloxone (NARCAN) injection 0.4 mg  0.4 mg IntraVENous PRN    enoxaparin (LOVENOX) injection 40 mg  40 mg SubCUTAneous Q24H    ondansetron (ZOFRAN) injection 4 mg  4 mg IntraVENous Q4H PRN   ______________________________________________________________________  EXPECTED LENGTH OF STAY: 4d 21h  ACTUAL LENGTH OF STAY:          7               Kath Kelly V, NP

## 2018-06-23 LAB
ANION GAP SERPL CALC-SCNC: 10 MMOL/L (ref 5–15)
BUN SERPL-MCNC: 7 MG/DL (ref 6–20)
BUN/CREAT SERPL: 19 (ref 12–20)
CALCIUM SERPL-MCNC: 8.8 MG/DL (ref 8.5–10.1)
CHLORIDE SERPL-SCNC: 111 MMOL/L (ref 97–108)
CO2 SERPL-SCNC: 21 MMOL/L (ref 21–32)
CREAT SERPL-MCNC: 0.37 MG/DL (ref 0.55–1.02)
GLUCOSE BLD STRIP.AUTO-MCNC: 105 MG/DL (ref 65–100)
GLUCOSE BLD STRIP.AUTO-MCNC: 111 MG/DL (ref 65–100)
GLUCOSE BLD STRIP.AUTO-MCNC: 122 MG/DL (ref 65–100)
GLUCOSE BLD STRIP.AUTO-MCNC: 130 MG/DL (ref 65–100)
GLUCOSE SERPL-MCNC: 108 MG/DL (ref 65–100)
POTASSIUM SERPL-SCNC: 3.2 MMOL/L (ref 3.5–5.1)
SERVICE CMNT-IMP: ABNORMAL
SODIUM SERPL-SCNC: 142 MMOL/L (ref 136–145)

## 2018-06-23 PROCEDURE — 77030020186 HC BOOT HL PROTCT SAGE -B

## 2018-06-23 PROCEDURE — 74011250636 HC RX REV CODE- 250/636: Performed by: NURSE PRACTITIONER

## 2018-06-23 PROCEDURE — 80048 BASIC METABOLIC PNL TOTAL CA: CPT

## 2018-06-23 PROCEDURE — 65270000029 HC RM PRIVATE

## 2018-06-23 PROCEDURE — 74011250637 HC RX REV CODE- 250/637: Performed by: NURSE PRACTITIONER

## 2018-06-23 PROCEDURE — 36415 COLL VENOUS BLD VENIPUNCTURE: CPT

## 2018-06-23 PROCEDURE — 74011250637 HC RX REV CODE- 250/637: Performed by: INTERNAL MEDICINE

## 2018-06-23 PROCEDURE — 82962 GLUCOSE BLOOD TEST: CPT

## 2018-06-23 PROCEDURE — 74011250636 HC RX REV CODE- 250/636: Performed by: HOSPITALIST

## 2018-06-23 RX ORDER — POTASSIUM CHLORIDE 20MEQ/15ML
20 LIQUID (ML) ORAL ONCE
Status: COMPLETED | OUTPATIENT
Start: 2018-06-23 | End: 2018-06-23

## 2018-06-23 RX ADMIN — CHOLESTYRAMINE 4 G: 4 POWDER, FOR SUSPENSION ORAL at 09:43

## 2018-06-23 RX ADMIN — PANTOPRAZOLE SODIUM 40 MG: 40 TABLET, DELAYED RELEASE ORAL at 06:23

## 2018-06-23 RX ADMIN — POTASSIUM CHLORIDE 40 MEQ: 40 SOLUTION ORAL at 09:43

## 2018-06-23 RX ADMIN — ACETAMINOPHEN 650 MG: 325 TABLET ORAL at 12:19

## 2018-06-23 RX ADMIN — LISINOPRIL 5 MG: 5 TABLET ORAL at 09:42

## 2018-06-23 RX ADMIN — Medication 10 ML: at 23:36

## 2018-06-23 RX ADMIN — DEXTROSE MONOHYDRATE 25 ML/HR: 5 INJECTION, SOLUTION INTRAVENOUS at 00:47

## 2018-06-23 RX ADMIN — ACETAMINOPHEN 650 MG: 325 TABLET ORAL at 23:35

## 2018-06-23 RX ADMIN — Medication 10 ML: at 12:12

## 2018-06-23 RX ADMIN — POTASSIUM CHLORIDE 20 MEQ: 40 SOLUTION ORAL at 12:12

## 2018-06-23 RX ADMIN — ENOXAPARIN SODIUM 40 MG: 100 INJECTION SUBCUTANEOUS at 23:35

## 2018-06-23 RX ADMIN — CHOLESTYRAMINE 4 G: 4 POWDER, FOR SUSPENSION ORAL at 16:25

## 2018-06-23 RX ADMIN — Medication 1 CAPSULE: at 09:42

## 2018-06-23 RX ADMIN — Medication 10 ML: at 06:23

## 2018-06-23 NOTE — PROGRESS NOTES
Hospitalist Progress Note  Madyson Lou NP  Answering service: [phone] from in house phone  Cell: [phone]      Date of Service:  2018  NAME:  Abiel Nava  :  1940  MRN:  [id number]    Admission Summary:   Pt presented to the ED with fever. The patient was found to have temperature of 104 this morning at the LTC facility - she was given tylenol which brought down her temp to 100.3. She also had an episode of vomiting/poor appetite and her urine was darker than usual.  EMS indicated that staff at [address] [place] [address] was concerned pt aspirated. Interval history / Subjective:      FMS output in last 24 hours is 250    Replete potassium  C/w ivf  Palliative to follow up with nephew on Monday, needs to bring MPOA papers. He's returning on  from [address]:     Severe sepsis (hypotension, fever, lactic acidosis, leukocytosis with UTI) POA:  Resolved  - + pyuria. UA was turbid with + large leuks, WBC 20-50 and 4+ bacteria.  Culture pending.  - + watery liquid stool - c-diff NEGATIVE -> Flagyl stopped ()  - lactic acidosis resolved   - continue IV Rocephin (stop date ) for UTI    Dysphagia  - speech evaluated  - puree diet with nectar thick liquids  - scopalamine patch for secretions  - discussed with medical poa would not want a peg tube, agree with this    CAUTI, has long chronic mosquera, E-coli and Providenecia UTI - treated with 5 days of ceftriaxone    Hypokalemia:Replete, monitor    Hypernatremia - improved, regressed, improved   - stop d5 1/2 ns and switch to d5w with potassium  -  restarted D5w, pt not drinking enough water   -  improving    Hx of hypertension:    -  added back lisinopril  - hydralazine prn    Diarrhea  - stool culture is negative, c-diff negative  - will need fecal fat and enzymes as outpatient if continues to analyze for malabsorption from pancreatic insufficiency  - lactose free diet  - immodium prn    Dehydration with elevated Creatinine: Resolved  - Cr now normal so will decrease IVF    Hx Brain injury with functional quadriplegia:    - Bed bound, Supportive care, reposition patient  - communicates with simple words    Failure to Thrive  - BMI 19.84, normal weight, but decreased intake 2/2 dysphagia, TBI, consult nutrition    Chronic Schafer Catheter  - ED changed catheter per nursing report    BENNY - resolved  - 2/2 dehydration    Code status: DNR  DVT prophylaxis: Lovenox   Care Plan discussed with: delmar, nurse, attending  Disposition: pt in long term care  POA is Chelsea Forte, her nephew: [phone]    6/21 Discussed with pt's Bere Valenzuela he would like to continue current efforts and if patient is not progressing in a few days we will transition to comfort care. Pt has no other relatives around. Chelsea Forte is currently in [place] and will bring MPOA paperwork in on Monday. Hospital Problems  Never Reviewed          Codes Class Noted POA    * (Principal)Sepsis [facility]) ICD-10-CM: A41.9  ICD-9-CM: 038.9, 995.91  6/15/2018 Yes        Catheter-associated urinary tract infection ([facility] ICD-10-CM: T83.511A, N39.0  ICD-9-CM: 996.64, 599.0  6/15/2018 Yes        Diarrhea ICD-10-CM: R19.7  ICD-9-CM: 787.91  6/15/2018 Yes        Functional quadriplegia ([facility] [address] ICD-10-CM: R53.2  ICD-9-CM: 780.72  6/15/2018 Yes            Review of Systems:   Alert. Answers in one/two words  Difficult to obtain      Vital Signs:    Last 24hrs VS reviewed since prior progress note.  Most recent are:  Visit Vitals    /65 (BP 1 Location: Right arm, BP Patient Position: At rest)    Pulse 75    Temp 97.7 °F (36.5 °C)    Resp 20    Ht 5' 2\" (1.575 m)    Wt 49.2 kg (108 lb 7.5 oz)    SpO2 100%    BMI 19.84 kg/m2       Intake/Output Summary (Last 24 hours) at 06/23/18 1159  Last data filed at 06/23/18 0041   Gross per 24 hour   Intake                0 ml   Output              725 ml   Net             -725 ml Physical Examination:         Constitutional:  No acute distress, cooperative, pleasant    ENT:  Oral mucous membranes dry. Lower teeth with thick plaque. Pt has a protruding lower jaw and lip. Resp:  CTA bilaterally. No wheezing. No accessory muscle use and on RA   CV:  Regular rhythm, normal rate, no murmurs. GI:  Soft, non distended, NTP, normoactive bowel sounds. + Flexiseal in place    Musculoskeletal:  No edema, warm, 2+ pulses throughout    Neurologic:  Moves all extremities. AAOx2, able to verbalize one to two words at baseline       Data Review:   Review and/or order of clinical lab test  Review and/or order of tests in the radiology section of CPT  Review and/or order of tests in the medicine section of CPT    Labs:     No results for input(s): WBC, HGB, HCT, PLT, HGBEXT, HCTEXT, PLTEXT, HGBEXT, HCTEXT, PLTEXT in the last 72 hours. Recent Labs      06/23/18   0607  06/22/18   0607  06/21/18   0530   NA  142  140  147*   K  3.2*  2.7*  3.0*   CL  111*  110*  117*   CO2  21  23  22   BUN  7  9  12   CREA  0.37*  0.42*  0.40*   GLU  108*  124*  105*   CA  8.8  8.5  8.2*   MG   --    --   1.7     No results for input(s): SGOT, GPT, ALT, AP, TBIL, TBILI, TP, ALB, GLOB, GGT, AML, LPSE in the last 72 hours. No lab exists for component: AMYP, HLPSE  No results for input(s): INR, PTP, APTT in the last 72 hours. No lab exists for component: INREXT, INREXT   No results for input(s): FE, TIBC, PSAT, FERR in the last 72 hours. No results found for: FOL, RBCF   No results for input(s): PH, PCO2, PO2 in the last 72 hours. No results for input(s): CPK, CKNDX, TROIQ in the last 72 hours.     No lab exists for component: CPKMB  No results found for: CHOL, CHOLX, CHLST, CHOLV, HDL, LDL, LDLC, DLDLP, TGLX, TRIGL, TRIGP, CHHD, CHHDX  Lab Results   Component Value Date/Time    Glucose (POC) 111 (H) 06/23/2018 11:16 AM    Glucose (POC) 122 (H) 06/23/2018 06:54 AM    Glucose (POC) 124 (H) 06/22/2018 09:01 PM Glucose (POC) 110 (H) 06/22/2018 04:12 PM    Glucose (POC) 106 (H) 06/22/2018 11:21 AM     Lab Results   Component Value Date/Time    Color DARK YELLOW 06/15/2018 07:48 PM    Appearance TURBID (A) 06/15/2018 07:48 PM    Specific gravity 1.024 06/15/2018 07:48 PM    pH (UA) 5.0 06/15/2018 07:48 PM    Protein 30 (A) 06/15/2018 07:48 PM    Glucose NEGATIVE  06/15/2018 07:48 PM    Ketone TRACE (A) 06/15/2018 07:48 PM    Urobilinogen 1.0 06/15/2018 07:48 PM    Nitrites NEGATIVE  06/15/2018 07:48 PM    Leukocyte Esterase LARGE (A) 06/15/2018 07:48 PM    Epithelial cells MODERATE (A) 06/15/2018 07:48 PM    Bacteria 4+ (A) 06/15/2018 07:48 PM    WBC 20-50 06/15/2018 07:48 PM    RBC 0-5 06/15/2018 07:48 PM     Medications Reviewed:     Current Facility-Administered Medications   Medication Dose Route Frequency    potassium chloride (KAON 10%) 20 mEq/15 mL oral liquid 20 mEq  20 mEq Oral ONCE    scopolamine (TRANSDERM-SCOP) 1 mg over 3 days 1 Patch  1 Patch TransDERmal Q72H    glucose chewable tablet 16 g  4 Tab Oral PRN    dextrose (D50W) injection syrg 12.5-25 g  12.5-25 g IntraVENous PRN    glucagon (GLUCAGEN) injection 1 mg  1 mg IntraMUSCular PRN    dextrose 5% infusion  25 mL/hr IntraVENous CONTINUOUS    cholestyramine-aspartame (QUESTRAN LIGHT) packet 4 g  4 g Oral BID WITH MEALS    potassium chloride (KAON 10%) 20 mEq/15 mL oral liquid 40 mEq  40 mEq Oral DAILY    lactobac ac& pc-s.therm-b.anim (RAYSHAWN Q/RISAQUAD)  1 Cap Oral DAILY    lisinopril (PRINIVIL, ZESTRIL) tablet 5 mg  5 mg Oral DAILY    pantoprazole (PROTONIX) 2 mg/mL oral suspension 40 mg  40 mg Oral ACB    hydrALAZINE (APRESOLINE) 20 mg/mL injection 10 mg  10 mg IntraVENous Q6H PRN    loperamide (IMODIUM) capsule 2 mg  2 mg Oral Q4H PRN    acetaminophen (TYLENOL) tablet 650 mg  650 mg Oral Q6H PRN    albuterol-ipratropium (DUO-NEB) 2.5 MG-0.5 MG/3 ML  3 mL Nebulization Q4H PRN    sodium chloride (NS) flush 5-10 mL  5-10 mL IntraVENous Q8H  sodium chloride (NS) flush 5-10 mL  5-10 mL IntraVENous PRN    naloxone (NARCAN) injection 0.4 mg  0.4 mg IntraVENous PRN    enoxaparin (LOVENOX) injection 40 mg  40 mg SubCUTAneous Q24H    ondansetron (ZOFRAN) injection 4 mg  4 mg IntraVENous Q4H PRN   ______________________________________________________________________  EXPECTED LENGTH OF STAY: 4d 21h  ACTUAL LENGTH OF STAY:          8               Kath Kelly V, NP

## 2018-06-23 NOTE — PROGRESS NOTES
2330-Patient blood pressure is 189/92 and hydralazine 10 mg iv given,will continue to monitor. 2440- Patient blood pressure is now 132/64.

## 2018-06-23 NOTE — PROGRESS NOTES
Bedside shift change report given to Lisa Casillas RN (oncoming nurse) by Rian Main RN (offgoing nurse). Report included the following information SBAR, Kardex, Intake/Output, MAR, Recent Results and Med Rec Status.

## 2018-06-24 LAB
GLUCOSE BLD STRIP.AUTO-MCNC: 100 MG/DL (ref 65–100)
GLUCOSE BLD STRIP.AUTO-MCNC: 111 MG/DL (ref 65–100)
GLUCOSE BLD STRIP.AUTO-MCNC: 91 MG/DL (ref 65–100)
GLUCOSE BLD STRIP.AUTO-MCNC: 92 MG/DL (ref 65–100)
SERVICE CMNT-IMP: ABNORMAL
SERVICE CMNT-IMP: NORMAL

## 2018-06-24 PROCEDURE — 65270000029 HC RM PRIVATE

## 2018-06-24 PROCEDURE — 74011250636 HC RX REV CODE- 250/636: Performed by: HOSPITALIST

## 2018-06-24 PROCEDURE — 74011250637 HC RX REV CODE- 250/637: Performed by: NURSE PRACTITIONER

## 2018-06-24 PROCEDURE — 74011250637 HC RX REV CODE- 250/637: Performed by: INTERNAL MEDICINE

## 2018-06-24 PROCEDURE — 82962 GLUCOSE BLOOD TEST: CPT

## 2018-06-24 PROCEDURE — 74011250636 HC RX REV CODE- 250/636: Performed by: NURSE PRACTITIONER

## 2018-06-24 RX ORDER — POTASSIUM CHLORIDE 20MEQ/15ML
40 LIQUID (ML) ORAL ONCE
Status: COMPLETED | OUTPATIENT
Start: 2018-06-24 | End: 2018-06-24

## 2018-06-24 RX ADMIN — CHOLESTYRAMINE 4 G: 4 POWDER, FOR SUSPENSION ORAL at 16:54

## 2018-06-24 RX ADMIN — PANTOPRAZOLE SODIUM 40 MG: 40 TABLET, DELAYED RELEASE ORAL at 07:21

## 2018-06-24 RX ADMIN — Medication 10 ML: at 22:34

## 2018-06-24 RX ADMIN — LISINOPRIL 5 MG: 5 TABLET ORAL at 09:26

## 2018-06-24 RX ADMIN — CHOLESTYRAMINE 4 G: 4 POWDER, FOR SUSPENSION ORAL at 07:21

## 2018-06-24 RX ADMIN — Medication 10 ML: at 12:15

## 2018-06-24 RX ADMIN — Medication 1 CAPSULE: at 09:26

## 2018-06-24 RX ADMIN — POTASSIUM CHLORIDE 40 MEQ: 40 SOLUTION ORAL at 12:15

## 2018-06-24 RX ADMIN — POTASSIUM CHLORIDE 40 MEQ: 40 SOLUTION ORAL at 09:26

## 2018-06-24 RX ADMIN — ACETAMINOPHEN 650 MG: 325 TABLET ORAL at 17:28

## 2018-06-24 RX ADMIN — ACETAMINOPHEN 650 MG: 325 TABLET ORAL at 09:35

## 2018-06-24 RX ADMIN — Medication 10 ML: at 07:21

## 2018-06-24 RX ADMIN — DEXTROSE MONOHYDRATE 25 ML/HR: 5 INJECTION, SOLUTION INTRAVENOUS at 16:54

## 2018-06-24 RX ADMIN — ENOXAPARIN SODIUM 40 MG: 100 INJECTION SUBCUTANEOUS at 22:34

## 2018-06-24 NOTE — PROGRESS NOTES
Problem: Pressure Injury - Risk of  Goal: *Prevention of pressure injury  Document Refugio Scale and appropriate interventions in the flowsheet    Air mattress  Offload heels with pillows  Barrier cream.    Outcome: Progressing Towards Goal  Pressure Injury Interventions:  Sensory Interventions: Assess changes in LOC, Avoid rigorous massage over bony prominences    Moisture Interventions: Moisture barrier    Activity Interventions: Increase time out of bed    Mobility Interventions: Float heels    Nutrition Interventions: Document food/fluid/supplement intake    Friction and Shear Interventions: HOB 30 degrees or less

## 2018-06-24 NOTE — PROGRESS NOTES
Hospitalist Progress Note  Elenita Madrigal NP  Answering service: [phone] from in house phone  Cell: [phone]      Date of Service:  2018  NAME:  Anselmo Marinelli  :  1940  MRN:  [id number]    Admission Summary:   Pt presented to the ED with fever. The patient was found to have temperature of 104 this morning at the [facility] facility - she was given tylenol which brought down her temp to 100.3. She also had an episode of vomiting/poor appetite and her urine was darker than usual.  EMS indicated that staff at [address] [place] [address] was concerned pt aspirated. Interval history / Subjective:      FMS output in last 24 hours is 400  Nursing did not record urine output but she is making urine  Continue to replete potassium  C/w ivf  Pt offers no new complaints this am, just wants to be turned  Palliative to follow up with nephew on Monday, needs to bring MPOA papers. He's returning on  from [address]:     Severe sepsis (hypotension, fever, lactic acidosis, leukocytosis with UTI) POA:  Resolved  - + pyuria. UA was turbid with + large leuks, WBC 20-50 and 4+ bacteria.  UTI treated  - + watery liquid stool - c-diff NEGATIVE -> Flagyl stopped ()  - lactic acidosis resolved   - continue IV Rocephin (stop date ) for UTI    Dysphagia  - speech evaluated  - puree diet with nectar thick liquids  - scopalamine patch for secretions  - discussed with medical poa would not want a peg tube, agree with this    CAUTI, has long chronic mosquera, E-coli and Providenecia UTI - treated with 5 days of ceftriaxone    Hypokalemia:Replete, monitor    Hypernatremia - improved, regressed, improved   - stop d5 1/2 ns and switch to d5w with potassium  -  restarted D5w, pt not drinking enough water   -  improving    Hx of hypertension:    -  added back lisinopril  - hydralazine prn    Diarrhea  - stool culture is negative, c-diff negative  - will need fecal fat and enzymes as outpatient if continues to analyze for malabsorption from pancreatic insufficiency  - lactose free diet  - immodium prn    Dehydration with elevated Creatinine: Resolved  - Cr now normal so will decrease IVF    Hx Brain injury with functional quadriplegia:    - Bed bound, Supportive care, reposition patient  - communicates with simple words    Failure to Thrive  - BMI 19.84, normal weight, but decreased intake 2/2 dysphagia, TBI, consult nutrition    Chronic Schafer Catheter  - ED changed catheter per nursing report    BENNY - resolved  - 2/2 dehydration    Code status: DNR  DVT prophylaxis: Lovenox   Care Plan discussed with: delmar, nurse, attending  Disposition: pt in long term care  POA is Epps Angie, her nephew: [phone]    6/21 Discussed with pt's Jill Polanco he would like to continue current efforts and if patient is not progressing in a few days we will transition to comfort care. Pt has no other relatives around. Mich Adams is currently in [place] and will bring MPOA paperwork in on Monday. Palliative to meet with Mich Adams. Hospital Problems  Never Reviewed          Codes Class Noted POA    * (Principal)Sepsis [facility]) ICD-10-CM: A41.9  ICD-9-CM: 038.9, 995.91  6/15/2018 Yes        Catheter-associated urinary tract infection ([facility] [address] ICD-10-CM: T83.511A, N39.0  ICD-9-CM: 996.64, 599.0  6/15/2018 Yes        Diarrhea ICD-10-CM: R19.7  ICD-9-CM: 787.91  6/15/2018 Yes        Functional quadri[facility]gia [facility] ICD-10-CM: R53.2  ICD-9-CM: 780.72  6/15/2018 Yes            Review of Systems:   Alert. Answers in one/two words  Difficult to obtain      Vital Signs:    Last 24hrs VS reviewed since prior progress note.  Most recent are:  Visit Vitals    /69 (BP 1 Location: Left arm, BP Patient Position: At rest)    Pulse 89    Temp 98 °F (36.7 °C)    Resp 18    Ht 5' 2\" (1.575 m)    Wt 49.2 kg (108 lb 7.5 oz)    SpO2 99%    BMI 19.84 kg/m2       Intake/Output Summary (Last 24 hours) at 06/24/18 [address] [place] [address] filed at 06/24/18 0720   Gross per 24 hour   Intake              663 ml   Output              950 ml   Net             -287 ml      Physical Examination:         Constitutional:  No acute distress, cooperative   ENT:  Oral mucous membranes dry. Lower teeth with thick plaque. Pt has a protruding lower jaw and lip. Resp:  CTA bilaterally. No wheezing. No accessory muscle use and on RA   CV:  Regular rhythm, normal rate, no murmurs. GI:  Soft, non distended, NTP, normoactive bowel sounds. + Flexiseal in place    Musculoskeletal:  No edema, warm, 2+ pulses throughout    Neurologic:  Moves all extremities. AAOx2, able to verbalize one to two words at baseline       Data Review:   Review and/or order of clinical lab test  Review and/or order of tests in the radiology section of CPT  Review and/or order of tests in the medicine section of CPT    Labs:     No results for input(s): WBC, HGB, HCT, PLT, HGBEXT, HCTEXT, PLTEXT, HGBEXT, HCTEXT, PLTEXT in the last 72 hours. Recent Labs      06/23/18   0607  06/22/18   0607   NA  142  140   K  3.2*  2.7*   CL  111*  110*   CO2  21  23   BUN  7  9   CREA  0.37*  0.42*   GLU  108*  124*   CA  8.8  8.5     No results for input(s): SGOT, GPT, ALT, AP, TBIL, TBILI, TP, ALB, GLOB, GGT, AML, LPSE in the last 72 hours. No lab exists for component: AMYP, HLPSE  No results for input(s): INR, PTP, APTT in the last 72 hours. No lab exists for component: INREXT, INREXT   No results for input(s): FE, TIBC, PSAT, FERR in the last 72 hours. No results found for: FOL, RBCF   No results for input(s): PH, PCO2, PO2 in the last 72 hours. No results for input(s): CPK, CKNDX, TROIQ in the last 72 hours.     No lab exists for component: CPKMB  No results found for: CHOL, CHOLX, CHLST, CHOLV, HDL, LDL, LDLC, DLDLP, TGLX, TRIGL, TRIGP, CHHD, CHHDX  Lab Results   Component Value Date/Time    Glucose (POC) 92 06/24/2018 07:19 AM    Glucose (POC) 130 (H) 06/23/2018 10:01 PM Glucose (POC) 105 (H) 06/23/2018 04:16 PM    Glucose (POC) 111 (H) 06/23/2018 11:16 AM    Glucose (POC) 122 (H) 06/23/2018 06:54 AM     Lab Results   Component Value Date/Time    Color DARK YELLOW 06/15/2018 07:48 PM    Appearance TURBID (A) 06/15/2018 07:48 PM    Specific gravity 1.024 06/15/2018 07:48 PM    pH (UA) 5.0 06/15/2018 07:48 PM    Protein 30 (A) 06/15/2018 07:48 PM    Glucose NEGATIVE  06/15/2018 07:48 PM    Ketone TRACE (A) 06/15/2018 07:48 PM    Urobilinogen 1.0 06/15/2018 07:48 PM    Nitrites NEGATIVE  06/15/2018 07:48 PM    Leukocyte Esterase LARGE (A) 06/15/2018 07:48 PM    Epithelial cells MODERATE (A) 06/15/2018 07:48 PM    Bacteria 4+ (A) 06/15/2018 07:48 PM    WBC 20-50 06/15/2018 07:48 PM    RBC 0-5 06/15/2018 07:48 PM     Medications Reviewed:     Current Facility-Administered Medications   Medication Dose Route Frequency    potassium chloride (KAON 10%) 20 mEq/15 mL oral liquid 40 mEq  40 mEq Oral ONCE    scopolamine (TRANSDERM-SCOP) 1 mg over 3 days 1 Patch  1 Patch TransDERmal Q72H    glucose chewable tablet 16 g  4 Tab Oral PRN    dextrose (D50W) injection syrg 12.5-25 g  12.5-25 g IntraVENous PRN    glucagon (GLUCAGEN) injection 1 mg  1 mg IntraMUSCular PRN    dextrose 5% infusion  25 mL/hr IntraVENous CONTINUOUS    cholestyramine-aspartame (QUESTRAN LIGHT) packet 4 g  4 g Oral BID WITH MEALS    potassium chloride (KAON 10%) 20 mEq/15 mL oral liquid 40 mEq  40 mEq Oral DAILY    lactobac ac& pc-s.therm-b.anim (RAYSHAWN Q/RISAQUAD)  1 Cap Oral DAILY    lisinopril (PRINIVIL, ZESTRIL) tablet 5 mg  5 mg Oral DAILY    pantoprazole (PROTONIX) 2 mg/mL oral suspension 40 mg  40 mg Oral ACB    hydrALAZINE (APRESOLINE) 20 mg/mL injection 10 mg  10 mg IntraVENous Q6H PRN    loperamide (IMODIUM) capsule 2 mg  2 mg Oral Q4H PRN    acetaminophen (TYLENOL) tablet 650 mg  650 mg Oral Q6H PRN    albuterol-ipratropium (DUO-NEB) 2.5 MG-0.5 MG/3 ML  3 mL Nebulization Q4H PRN    sodium chloride (NS) flush 5-10 mL  5-10 mL IntraVENous Q8H    sodium chloride (NS) flush 5-10 mL  5-10 mL IntraVENous PRN    naloxone (NARCAN) injection 0.4 mg  0.4 mg IntraVENous PRN    enoxaparin (LOVENOX) injection 40 mg  40 mg SubCUTAneous Q24H    ondansetron (ZOFRAN) injection 4 mg  4 mg IntraVENous Q4H PRN   ______________________________________________________________________  EXPECTED LENGTH OF STAY: 4d 21h  ACTUAL LENGTH OF STAY:          9               Kath Kelly V NP

## 2018-06-25 LAB
ANION GAP SERPL CALC-SCNC: 8 MMOL/L (ref 5–15)
BUN SERPL-MCNC: 5 MG/DL (ref 6–20)
BUN/CREAT SERPL: 14 (ref 12–20)
CALCIUM SERPL-MCNC: 8.4 MG/DL (ref 8.5–10.1)
CHLORIDE SERPL-SCNC: 111 MMOL/L (ref 97–108)
CO2 SERPL-SCNC: 24 MMOL/L (ref 21–32)
CREAT SERPL-MCNC: 0.36 MG/DL (ref 0.55–1.02)
GLUCOSE BLD STRIP.AUTO-MCNC: 112 MG/DL (ref 65–100)
GLUCOSE BLD STRIP.AUTO-MCNC: 138 MG/DL (ref 65–100)
GLUCOSE BLD STRIP.AUTO-MCNC: 139 MG/DL (ref 65–100)
GLUCOSE BLD STRIP.AUTO-MCNC: 89 MG/DL (ref 65–100)
GLUCOSE SERPL-MCNC: 105 MG/DL (ref 65–100)
MAGNESIUM SERPL-MCNC: 1.7 MG/DL (ref 1.6–2.4)
PHOSPHATE SERPL-MCNC: 2.7 MG/DL (ref 2.6–4.7)
POTASSIUM SERPL-SCNC: 3.4 MMOL/L (ref 3.5–5.1)
SERVICE CMNT-IMP: ABNORMAL
SERVICE CMNT-IMP: NORMAL
SODIUM SERPL-SCNC: 143 MMOL/L (ref 136–145)

## 2018-06-25 PROCEDURE — 80048 BASIC METABOLIC PNL TOTAL CA: CPT | Performed by: HOSPITALIST

## 2018-06-25 PROCEDURE — 83735 ASSAY OF MAGNESIUM: CPT | Performed by: HOSPITALIST

## 2018-06-25 PROCEDURE — 74011250637 HC RX REV CODE- 250/637: Performed by: INTERNAL MEDICINE

## 2018-06-25 PROCEDURE — 74011250637 HC RX REV CODE- 250/637: Performed by: NURSE PRACTITIONER

## 2018-06-25 PROCEDURE — 74011000258 HC RX REV CODE- 258: Performed by: NURSE PRACTITIONER

## 2018-06-25 PROCEDURE — 65270000029 HC RM PRIVATE

## 2018-06-25 PROCEDURE — 36415 COLL VENOUS BLD VENIPUNCTURE: CPT | Performed by: HOSPITALIST

## 2018-06-25 PROCEDURE — 74011250636 HC RX REV CODE- 250/636: Performed by: HOSPITALIST

## 2018-06-25 PROCEDURE — 74011250636 HC RX REV CODE- 250/636: Performed by: NURSE PRACTITIONER

## 2018-06-25 PROCEDURE — 84100 ASSAY OF PHOSPHORUS: CPT | Performed by: HOSPITALIST

## 2018-06-25 PROCEDURE — 92526 ORAL FUNCTION THERAPY: CPT

## 2018-06-25 PROCEDURE — 82962 GLUCOSE BLOOD TEST: CPT

## 2018-06-25 RX ORDER — POTASSIUM CHLORIDE 20MEQ/15ML
40 LIQUID (ML) ORAL ONCE
Status: COMPLETED | OUTPATIENT
Start: 2018-06-25 | End: 2018-06-25

## 2018-06-25 RX ORDER — OXYCODONE HYDROCHLORIDE 5 MG/1
2.5 TABLET ORAL
Status: DISCONTINUED | OUTPATIENT
Start: 2018-06-25 | End: 2018-06-27 | Stop reason: HOSPADM

## 2018-06-25 RX ORDER — CHOLESTYRAMINE 4 G/4.8G
4 POWDER, FOR SUSPENSION ORAL
Status: DISCONTINUED | OUTPATIENT
Start: 2018-06-25 | End: 2018-06-27 | Stop reason: HOSPADM

## 2018-06-25 RX ORDER — ACETAMINOPHEN 325 MG/1
650 TABLET ORAL EVERY 6 HOURS
Status: DISCONTINUED | OUTPATIENT
Start: 2018-06-25 | End: 2018-06-27 | Stop reason: HOSPADM

## 2018-06-25 RX ORDER — SODIUM CHLORIDE 450 MG/100ML
50 INJECTION, SOLUTION INTRAVENOUS CONTINUOUS
Status: DISCONTINUED | OUTPATIENT
Start: 2018-06-25 | End: 2018-06-26

## 2018-06-25 RX ORDER — MAGNESIUM SULFATE HEPTAHYDRATE 40 MG/ML
2 INJECTION, SOLUTION INTRAVENOUS ONCE
Status: COMPLETED | OUTPATIENT
Start: 2018-06-25 | End: 2018-06-25

## 2018-06-25 RX ADMIN — Medication 10 ML: at 15:09

## 2018-06-25 RX ADMIN — POTASSIUM CHLORIDE 40 MEQ: 40 SOLUTION ORAL at 15:03

## 2018-06-25 RX ADMIN — ENOXAPARIN SODIUM 40 MG: 100 INJECTION SUBCUTANEOUS at 23:43

## 2018-06-25 RX ADMIN — LISINOPRIL 5 MG: 5 TABLET ORAL at 08:36

## 2018-06-25 RX ADMIN — ACETAMINOPHEN 650 MG: 325 TABLET ORAL at 11:26

## 2018-06-25 RX ADMIN — CHOLESTYRAMINE 4 G: 4 POWDER, FOR SUSPENSION ORAL at 08:36

## 2018-06-25 RX ADMIN — ACETAMINOPHEN 650 MG: 325 TABLET ORAL at 05:10

## 2018-06-25 RX ADMIN — SODIUM CHLORIDE 50 ML/HR: 450 INJECTION, SOLUTION INTRAVENOUS at 08:46

## 2018-06-25 RX ADMIN — Medication 10 ML: at 05:10

## 2018-06-25 RX ADMIN — CHOLESTYRAMINE 4 G: 4 POWDER, FOR SUSPENSION ORAL at 12:00

## 2018-06-25 RX ADMIN — Medication 1 CAPSULE: at 08:36

## 2018-06-25 RX ADMIN — CHOLESTYRAMINE 4 G: 4 POWDER, FOR SUSPENSION ORAL at 17:47

## 2018-06-25 RX ADMIN — Medication 10 ML: at 23:43

## 2018-06-25 RX ADMIN — PANTOPRAZOLE SODIUM 40 MG: 40 TABLET, DELAYED RELEASE ORAL at 08:54

## 2018-06-25 RX ADMIN — MAGNESIUM SULFATE HEPTAHYDRATE 2 G: 40 INJECTION, SOLUTION INTRAVENOUS at 08:55

## 2018-06-25 RX ADMIN — ACETAMINOPHEN 650 MG: 325 TABLET ORAL at 17:47

## 2018-06-25 RX ADMIN — POTASSIUM CHLORIDE 40 MEQ: 40 SOLUTION ORAL at 08:35

## 2018-06-25 RX ADMIN — ACETAMINOPHEN 650 MG: 325 TABLET ORAL at 23:43

## 2018-06-25 RX ADMIN — OXYCODONE HYDROCHLORIDE 2.5 MG: 5 TABLET ORAL at 14:56

## 2018-06-25 NOTE — PROGRESS NOTES
Problem: Dysphagia (Adult)  Goal: *Acute Goals and Plan of Care (Insert Text)  Speech Therapy Goals  Initiated 6/20/2018  1. Patient will tolerate dysphagia 1 diet with nectar thick liquids without signs/symptoms of aspiration within 7day(s). MET 6/25/2018   2. Patient will tolerate trials of thin liquids with SLP within 7 days. Discontinued 6/21/2018    Speech language pathology dysphagia treatment/discharge  Patient: Fran Byrnes (66 y.o. female)  Date: 6/25/2018  Diagnosis: Sepsis ([facility] [address] Sepsis ([facility] [address]       Precautions: aspiration      ASSESSMENT:  Patient tolerating baseline diet of puree/ nectar thick liquids, however, intake remains limited. She continues with mod-severe oral and mod pharyngeal dysphagia. Orally, patient with drooling, anterior spillage, prolonged bolus manipulation and delayed posterior propulsion. Suspect delayed swallow initiation and reduced hyolaryngeal elevation/excursion via palpation. No overt s/s aspiration with 4oz nectar thick liquid via spoon which is all she was agreeable today. Patient tolerating her baseline diet of puree/ nectar. Suspect intake will continue to be a challenge and she will remain at a high risk of aspiration given positioning, dependence for feeding and cognition. Progression toward goals:   []           Improving appropriately and progressing toward goals  [x]           Improving slowly and progressing toward goals  []           Not making progress toward goals and plan of care will be adjusted     PLAN:  Continue baseline diet of puree (dysphagia 1)/ NECTAR thick liquids. Recommend giving by teaspoon  CRUSH meds  Strict upright positioning and 1:1 assistance with PO    Patient will be discharged from speech therapy at this time.   Rationale for discharge:  [x]      Goals Achieved  []      [address]  []      Patient not participating in therapy  []      Other:  Discharge Recommendations:  None- return to NH     SUBJECTIVE:   Patient stated I need to be turned. OBJECTIVE:   Cognitive and Communication Status:  Neurologic State: Alert  Orientation Level: Oriented to person  Cognition: Follows commands    Perception: Appears intact    Perseveration: No perseveration noted       Dysphagia Treatment:  Oral Assessment:  Oral Assessment  Labial: Decreased rate;Decreased seal  Dentition: Natural  Lingual: Decreased rate  Velum: Unable to visualize  Mandible: Restricted  P.O. Trials:  Patient Position:  (up in bed)  Vocal quality prior to P.O.: Low volume  Consistency Presented: Nectar thick liquid  How Presented: Spoon;SLP-fed/presented     Bolus Acceptance: No impairment  Bolus Formation/Control: Impaired  Type of Impairment: Anterior;Spillage  Propulsion: Delayed (# of seconds)  Oral Residue: None  Initiation of Swallow: Delayed (# of seconds)  Laryngeal Elevation: Decreased  Aspiration Signs/Symptoms: None              Oral Phase Severity: Moderate-severe  Pharyngeal Phase Severity : Moderate-severe          G Codes: In compliance with CMSs Claims Based Outcome Reporting, the following G-code set was chosen for this patient based the use of the NOMS functional outcome to quantify this patient's level of swallowing impairment. Using the NOMS, the patient was determined to be at level 3 for swallow function which correlates with the CL= 60-79% level of severity. Based on the objective assessment provided within this note, the current, goal, and discharge g-codes are as follows:    Swallow  Swallowing:   Swallow D/C Status CL= 60-79%      NOMS Swallowing Levels:  Level 1 (CN): NPO  Level 2 (CM): NPO but takes consistency in therapy  Level 3 (CL): Takes less than 50% of nutrition p.o. and continues with nonoral feedings; and/or safe with mod cues; and/or max diet restriction  Level 4 (CK):  Safe swallow but needs mod cues; and/or mod diet restriction; and/or still requires some nonoral feeding/supplements  Level 5 (CJ): Safe swallow with min diet restriction; and/or needs min cues  Level 6 (CI): Independent with p.o.; rare cues; usually self cues; may need to avoid some foods or needs extra time  Level 7 ([address] [place] [address]): Independent for all p.o.  MCKENNA. (2003). National Outcomes Measurement System (NOMS): Adult Speech-Language Pathology User's Guide. After treatment:   []                Patient left in no apparent distress sitting up in chair  [x]                Patient left in no apparent distress in bed  [x]                Call bell left within reach  [x]                Nursing notified  []                Caregiver present  []                Bed alarm activated    COMMUNICATION/EDUCATION:     The patients plan of care including recommendations, planned interventions, and recommended diet changes were discussed with: Registered Nurse. [x]                Posted safety precautions in patient's room.     Heather Nina, SANDRA  Time Calculation: 12 mins

## 2018-06-25 NOTE — PALLIATIVE CARE
Palliative Medicine Social Work  Addendum:  1500  Dr. Laura Hurtado and I met with patient and her nephew, Sophie Sanchez ([phone]). He provided copy of General POA which did not include medical provision. Patient was alert, oriented to time, placed and situation. She verbalized her desire to name her nephew as mPOA which we documented in [address] [place] Directive for [employer]. Engaged in discussion about resuscitative efforts in context of her chronic, progressive conditions. Patient informed that she did not want to make a decision about this, but would trust her nephew to do so. He signed DDNR on her behalf. Documents placed in chart for scanning. Patient has been fairly stable of late; no recent admissions. She has been treated for UTI at facility; also been having decreased appetite over the last few months with no noticeable weight loss. Dysphagia is not new to patient who has accommodated to diet at [facility]. She does not like the food here, however. Discussed the concerns she may not be taking in enough to sustain life over time; also described as normal part of her aging. She is not in favor of PEG tube which we supported. Encouraged ongoing efforts to eat/drink what she can back at [facility]. Goals clear now to continuing with current care. Nephew aware that he can shift focus to comfort and have hospice in facility if she has more frequent bout of infections; weight loss; any symptoms that lead to decline.                        Placed a call to patient's nephew, Sophie Sanchez ([phone]) who was en route to [place]. Meeting arranged for 2:30pm today. He will be bringing copy of mPOA. Thank you for the opportunity to be involved in the care of Ms. Flavio Leon. Elizabeth Alfonso, DORIW, [facility]-  Palliative Medicine   Respecting Choices ® ACP Facilitator   [phone]

## 2018-06-25 NOTE — ACP (ADVANCE CARE PLANNING)
Dr. Papito Marquez and I met with patient and her nephew, Caryle Friar ([phone]). He provided copy of General POA which did not include medical provision. Patient was alert, oriented to time, placed and situation. She verbalized her desire to name her nephew as mPOA which we documented in [address] Directive for [employer]. Engaged in discussion about resuscitative efforts in context of her chronic, progressive conditions. Patient informed that she did not want to make a decision about this, but would trust her nephew to do so. He signed DDNR on her behalf. Documents placed in chart for scanning.

## 2018-06-25 NOTE — CONSULTS
Palliative Medicine Consult  Francesco: [phone])    Patient Name: Karlie Garzon  YOB: 1940    Date of Initial Consult: 18  Reason for Consult: care decisions  Requesting Provider: Robin  Primary Care Physician: Misael Winston MD     SUMMARY:   Karlie Garzon is a 68 y.o. with a past history of brain injury, generalized weakness/debility, bed bound, long term NH resident (~ 10 years), chronic mosquera catheter who was admitted on 6/15/2018 from [facility] with fever, lethargy. At baseline an communicate in simple words. Admitted for sepsis due to UTI, on IV abx. Having diarrhea,  c diff neg. Tolerating dysphagia diet w/ nectar thicks, but poor po intake. Was dehydrated upon admission. Current medical issues leading to Palliative Medicine involvement include:care decisions . Do not see any recent hospitalizations. Social: Pt was  for many years. Her   ~ 3 years ago (they shared a room at [facility]). No children. PALLIATIVE DIAGNOSES:   1. Mod-severe oral and mod pharyngeal dysphagia   2. Debility- baseline contractures and bed bound  3. Poor po intake   4. Generalized pain   5. Goals of care       PLAN:   1. Along w/ Dagoberto Griffin LCSW met w/ pt and her general POA, nephew Young Coffee. 2. We had good conversations, see below- included this in transition of care note. Pt has difficulty speaking at times, but answers orientation questions correctly and is consistent w/ her trusting Young Coffee for making medical decisions even when we ask in mult different way. 3. Goals clear for recovery from UTI and acute issues and to return to [facility], but no aggressive interventions. 4. Initial consult note routed to primary continuity provider  5. Communicated plan of care with: Palliative IDT; Naty Durham RN    Dear Yodit Gabriel and Hector Canales,     It was a pleasure to meet you today.  You're urinary tract infection is being treated and you are feeling better.    -We talked about your difficulty swallowing. For many years you have been on a pureed diet and thickened liquids, but recently your appetite has slowed down. You and Nefrancy Trevino have decided against a feeding tube in the future, but to try and take as much as you can by mouth. - Even though you have had a mosquera catheter for many years, this is your first hospitalization since living at [facility]. All of the previous UTIs were treated with oral antibiotics. -You have a general power of  (POA) but this does not include a provision for a medical power of  (mPOA). It is clear that you and Roscoe Trevino make decisions together, but in the future if you lose capacity to give input you have told us that you trust Roscoe Trevino to make decisions on your behalf and to honor your wishes.     -Today we completed an advanced medical directive naming Sharron Norman, your nephew, as mPOA. -You did not want to talk about resuscitation status, but deferred to Roscoe Trevino who made a decision in your best interest.     -Right now you are recovering well, but if in the future you have recurrent infections or hospitalizations, have weight loss, or a general decline we talked about a service like hospice to help care for you at [facility]. Take great care. If you are hospitalized again, we would love to be part of your care again.     Coleman Rajput MD and Arturo Alfonso [facility]      GOALS OF CARE / TREATMENT PREFERENCES:     GOALS OF CARE:  Patient/Health Care Proxy Stated Goals: Rehabilitation      TREATMENT PREFERENCES:   Code Status: DNR-DDNR signed    Advance Care Planning:  Advance Care Planning 6/25/2018   Patient's Healthcare Decision Maker is: Named in scanned ACP document   Primary Decision Maker Name Sharron Norman   Primary Decision Maker Phone Number [phone]   Primary Decision Maker Relationship to Patient Other relative   Confirm Advance Directive Yes, on file   Patient Would Like to Complete Advance Directive Yes   Does the patient have other document types Power of [place]; Do Not Resuscitate       Medical Interventions: Limited additional interventions (Full measures to recover from an acute illness)   Other Instructions: Other:    As far as possible, the palliative care team has discussed with patient / health care proxy about goals of care / treatment preferences for patient. HISTORY:     History obtained from: Pt, family, chart, staff    CHIEF COMPLAINT: poor appetite    HPI/SUBJECTIVE:    The patient is:   [x] Verbal and participatory  [] Non-participatory due to:     Pt able to talk, but does not do so unless ask direct questions. Knows year, where she is, and about her infection. Consistent in her answers. Clinical Pain Assessment (nonverbal scale for severity on nonverbal patients):   Clinical Pain Assessment  Severity: 0     Activity (Movement): Seeking attention through movement or slow, cautious movement    Duration: for how long has pt been experiencing pain (e.g., 2 days, 1 month, years)  Frequency: how often pain is an issue (e.g., several times per day, once every few days, constant)     FUNCTIONAL ASSESSMENT:     Palliative Performance Scale (PPS):  PPS: 40       PSYCHOSOCIAL/SPIRITUAL SCREENING:     Palliative IDT has assessed this patient for cultural preferences / practices and a referral made as appropriate to needs (Cultural Services, Patient Advocacy, Ethics, etc.)    Advance Care Planning:  Advance Care Planning 6/25/2018   Patient's Healthcare Decision Maker is: Named in scanned ACP document   Primary Decision Maker Name Nichelle Sanchez   Primary Decision Maker Phone Number [phone]   Primary Decision Maker Relationship to Patient Other relative   Confirm Advance Directive Yes, on file   Patient Would Like to Complete Advance Directive Yes   Does the patient have other document types Power of [place]; Do Not Resuscitate       Any spiritual / Mandaen concerns:  [] Yes /  [x] No    Caregiver Burnout:  [] Yes /  [x] No /  [] No Caregiver Present      Anticipatory grief assessment:   [x] Normal  / [] Maladaptive       ESAS Anxiety: Anxiety: 0     ESAS Depression: Depression: 0        REVIEW OF SYSTEMS:     Positive and pertinent negative findings in ROS are noted above in HPI. The following systems were [x] reviewed / [] unable to be reviewed as noted in HPI  Other findings are noted below. Systems: constitutional, ears/nose/mouth/throat, respiratory, gastrointestinal, genitourinary, musculoskeletal, integumentary, neurologic, psychiatric, endocrine. Positive findings noted below. Modified ESAS Completed by: provider   Fatigue: 8 Drowsiness: 0   Depression: 0 Pain: 0   Anxiety: 0 Nausea: 0   Anorexia: 5 Dyspnea: 0           Stool Occurrence(s): 1        PHYSICAL EXAM:     From RN flowsheet:  Wt Readings from Last 3 Encounters:   06/17/18 108 lb 7.5 oz (49.2 kg)     Blood pressure 189/88, pulse 78, temperature 97.8 °F (36.6 °C), resp. rate 17, height 5' 2\" (1.575 m), weight 108 lb 7.5 oz (49.2 kg), SpO2 100 %.     Pain Scale 1: Numeric (0 - 10)  Pain Intensity 1: 5     Pain Location 1: Generalized  Pain Orientation 1: Lower  Pain Description 1: Aching  Pain Intervention(s) 1: Medication (see MAR)      Constitutional: awake, alert, oriented   Eyes: pupils equal, anicteric  ENMT: no nasal discharge, moist mucous membranes  Respiratory: breathing not labored  Musculoskeletal: contractures  Skin: warm, dry  Neurologic: following commands       HISTORY:     Principal Problem:    Sepsis ([address] (6/15/2018)    Active Problems:    Catheter-associated urinary tract infection ([address] (6/15/2018)      Diarrhea (6/15/2018)      Functional quadriplegia (HCC) (6/15/2018)      Past Medical History:   Diagnosis Date    Diabetes ([address]     Gastrointestinal disorder     intestinal obstruction, gastro-esophageal reflux    Hypertension     Ill-defined condition     demyelinating disease of central nervous system    Ill-defined condition     dysphagia, oropharyngeal phase    Ill-defined condition     neuromuscular dysfunction of bladder, retention of urine      No past surgical history on file. No family history on file. History reviewed, no pertinent family history.   Social History   Substance Use Topics    Smoking status: Not on file    Smokeless tobacco: Not on file    Alcohol use Not on file     No Known Allergies   Current Facility-Administered Medications   Medication Dose Route Frequency    acetaminophen (TYLENOL) tablet 650 mg  650 mg Oral Q6H    cholestyramine-aspartame (QUESTRAN LIGHT) packet 4 g  4 g Oral TID WITH MEALS    0.45% sodium chloride infusion  50 mL/hr IntraVENous CONTINUOUS    oxyCODONE IR (ROXICODONE) tablet 2.5 mg  2.5 mg Oral Q4H PRN    scopolamine (TRANSDERM-SCOP) 1 mg over 3 days 1 Patch  1 Patch TransDERmal Q72H    glucose chewable tablet 16 g  4 Tab Oral PRN    dextrose (D50W) injection syrg 12.5-25 g  12.5-25 g IntraVENous PRN    glucagon (GLUCAGEN) injection 1 mg  1 mg IntraMUSCular PRN    potassium chloride (KAON 10%) 20 mEq/15 mL oral liquid 40 mEq  40 mEq Oral DAILY    lactobac ac& pc-s.therm-b.anim (RAYSHAWN Q/RISAQUAD)  1 Cap Oral DAILY    lisinopril (PRINIVIL, ZESTRIL) tablet 5 mg  5 mg Oral DAILY    pantoprazole (PROTONIX) 2 mg/mL oral suspension 40 mg  40 mg Oral ACB    hydrALAZINE (APRESOLINE) 20 mg/mL injection 10 mg  10 mg IntraVENous Q6H PRN    loperamide (IMODIUM) capsule 2 mg  2 mg Oral Q4H PRN    albuterol-ipratropium (DUO-NEB) 2.5 MG-0.5 MG/3 ML  3 mL Nebulization Q4H PRN    sodium chloride (NS) flush 5-10 mL  5-10 mL IntraVENous Q8H    sodium chloride (NS) flush 5-10 mL  5-10 mL IntraVENous PRN    naloxone (NARCAN) injection 0.4 mg  0.4 mg IntraVENous PRN    enoxaparin (LOVENOX) injection 40 mg  40 mg SubCUTAneous Q24H    ondansetron (ZOFRAN) injection 4 mg  4 mg IntraVENous Q4H PRN          LAB AND IMAGING FINDINGS:     Lab Results   Component Value Date/Time    WBC 7.8 06/20/2018 04:26 AM    HGB 11.4 (L) 06/20/2018 04:26 AM    PLATELET 213 62/14/0165 04:26 AM     Lab Results   Component Value Date/Time    Sodium 143 06/25/2018 04:40 AM    Potassium 3.4 (L) 06/25/2018 04:40 AM    Chloride 111 (H) 06/25/2018 04:40 AM    CO2 24 06/25/2018 04:40 AM    BUN 5 (L) 06/25/2018 04:40 AM    Creatinine 0.36 (L) 06/25/2018 04:40 AM    Calcium 8.4 (L) 06/25/2018 04:40 AM    Magnesium 1.7 06/25/2018 04:40 AM    Phosphorus 2.7 06/25/2018 04:40 AM      Lab Results   Component Value Date/Time    AST (SGOT) 44 (H) 06/15/2018 06:36 PM    Alk. phosphatase 78 06/15/2018 06:36 PM    Protein, total 7.4 06/15/2018 06:36 PM    Albumin 2.8 (L) 06/15/2018 06:36 PM    Globulin 4.6 (H) 06/15/2018 06:36 PM     No results found for: INR, PTMR, PTP, PT1, PT2, APTT   No results found for: IRON, FE, TIBC, IBCT, PSAT, FERR   No results found for: PH, PCO2, PO2  No components found for: GLPOC   No results found for: CPK, CKMB             Total time: 70 min   Counseling / coordination time, spent as noted above: 50 min   > 50% counseling / coordination?: yes    Prolonged service was provided for  []30 min   []75 min in face to face time in the presence of the patient, spent as noted above. Time Start:   Time End:   Note: this can only be billed with 51182 (initial) or 20024 (follow up). If multiple start / stop times, list each separately.

## 2018-06-25 NOTE — PROGRESS NOTES
Hospitalist Progress Note  Rosas Cheung NP  Answering service: [phone] OR 36 from in house phone  Cell: [phone]   Date of Service:  2018  NAME:  Mohan Grant  :  1940  MRN:  [id number]    Admission Summary:   Pt presented to the ED with fever. The patient was found to have temperature of 104 the am of admit at her LTC facility - she was given tylenol which brought down her temp to 100.3. She also had an episode of vomiting/poor appetite and her urine was darker than usual.  EMS indicated that staff at [address] [place] [address] was concerned pt aspirated. Interval history / Subjective:      Pt in bed, no new complaints but still feels generalized pain - improves when she receives tylenol and is agreeable to placing her on a scheduled regimen. She is having no SOB, or abdominal cramping. FMS is still in place, 475 output since yesterday. Assessment & Plan:     Severe sepsis (hypotension, fever, lactic acidosis, leukocytosis with UTI) POA:  Resolved  - + pyuria. UA was turbid with + large leuks, WBC 20-50 and 4+ bacteria.  UTI has been treated with Rocephin  - + watery liquid stool - c-diff NEGATIVE -> Flagyl stopped ()  - lactic acidosis resolved     Dysphagia  - speech evaluated  - puree diet with nectar thick liquids  - scopalamine patch for secretions  - discussed with medical POA that pt would not want a peg tube - agree with this    CAUTI (long-term chronic mosquera) (POA):  - E-coli and Providenecia UTI - treated with 5 days of ceftriaxone  - ED changed catheter per nursing report    Hypokalemia: Replete, give an additional dose of potassium today  - magnesium is mildly low but will replete as well    Hypernatremia: improved, regressed, resolved  - switch IVF to 1/2 NS for maintenance    Hx of hypertension:    - on lisinopril and may have hydralazine prn  - BP fairly well controlled    Diarrhea (POA):  - stool culture is negative, c-diff negative  - will need fecal fat and enzymes as outpatient if continues to analyze for malabsorption from pancreatic insufficiency  - lactose free diet  - immodium prn  - increase questran to TID    BENNY/Dehydration with elevated Creatinine: Resolved    Hx Brain injury with functional quadriplegia:    - Bed bound, Supportive care, reposition patient  - communicates with simple words  - palliative care consult, nephew to bring in mPOA papers and meet with palliative today    Failure to Thrive  - BMI 19.84, normal weight  - but decreased intake 2/2 dysphagia    Code status: DNR  DVT prophylaxis: Lovenox   Care Plan discussed with: patient  Disposition: pt in long term care  POA is David Mcfarland, her nephew: [phone]    On 6/21 MPOA David Mcfarland: would like to continue current efforts and if patient is not progressing in a few days we will transition to comfort care. Pt has no other relatives around. David Mcfarland was in [place] and plans to bring MPOA paperwork in on Monday and meet with Palliative care. Hospital Problems  Never Reviewed          Codes Class Noted POA    * (Principal)Sepsis [facility]) ICD-10-CM: A41.9  ICD-9-CM: 038.9, 995.91  6/15/2018 Yes        Catheter-associated urinary tract infection ([facility] ICD-10-CM: T83.511A, N39.0  ICD-9-CM: 996.64, 599.0  6/15/2018 Yes        Diarrhea ICD-10-CM: R19.7  ICD-9-CM: 787.91  6/15/2018 Yes        Functional quadriplegia ([facility] ICD-10-CM: R53.2  ICD-9-CM: 780.72  6/15/2018 Yes            Review of Systems:   Alert, soft spoken. Denies HA. No chest pain or pressure. No SOB or GI complaints. Has \"generalized pain\". Vital Signs:    Last 24hrs VS reviewed since prior progress note.  Most recent are:  Visit Vitals    /82 (BP 1 Location: Right leg, BP Patient Position: At rest)    Pulse 71    Temp 98.6 °F (37 °C)    Resp 18    Ht 5' 2\" (1.575 m)    Wt 49.2 kg (108 lb 7.5 oz)    SpO2 99%    BMI 19.84 kg/m2       Intake/Output Summary (Last 24 hours) at 06/25/18 5559  Last data filed at 06/25/18 0658   Gross per 24 hour   Intake              430 ml   Output              325 ml   Net              105 ml      Physical Examination:         Constitutional:  No acute distress, cooperative   ENT:  Oral mucous membranes dry. Lower teeth w/thick plaque. Has protruding lower jaw and lip. Resp:  CTA bilaterally. No wheezing. No accessory muscle use and on RA   CV:  Regular rhythm, normal rate, no murmurs. GI:  Soft, non distended, midly tender with deep palpation. Normoactive bowel sounds. + Flexiseal in place    Musculoskeletal:  No edema, warm, 2+ pulses throughout    Neurologic:  Moves all extremities. Alert and oriented to person/family, place and time       Data Review:   Review and/or order of clinical lab test  Review and/or order of tests in the radiology section of CPT  Review and/or order of tests in the medicine section of CPT    Labs:     No results for input(s): WBC, HGB, HCT, PLT, HGBEXT, HCTEXT, PLTEXT, HGBEXT, HCTEXT, PLTEXT in the last 72 hours. Recent Labs      06/25/18   0440  06/23/18   0607   NA  143  142   K  3.4*  3.2*   CL  111*  111*   CO2  24  21   BUN  5*  7   CREA  0.36*  0.37*   GLU  105*  108*   CA  8.4*  8.8   MG  1.7   --    PHOS  2.7   --      No results for input(s): SGOT, GPT, ALT, AP, TBIL, TBILI, TP, ALB, GLOB, GGT, AML, LPSE in the last 72 hours. No lab exists for component: AMYP, HLPSE  No results for input(s): INR, PTP, APTT in the last 72 hours. No lab exists for component: INREXT, INREXT   No results for input(s): FE, TIBC, PSAT, FERR in the last 72 hours. No results found for: FOL, RBCF   No results for input(s): PH, PCO2, PO2 in the last 72 hours. No results for input(s): CPK, CKNDX, TROIQ in the last 72 hours.     No lab exists for component: CPKMB  No results found for: CHOL, CHOLX, CHLST, CHOLV, HDL, LDL, LDLC, DLDLP, TGLX, TRIGL, TRIGP, CHHD, CHHDX  Lab Results   Component Value Date/Time    Glucose (POC) 89 06/25/2018 05:42 AM    Glucose (POC) 100 06/24/2018 09:41 PM    Glucose (POC) 91 06/24/2018 04:13 PM    Glucose (POC) 111 (H) 06/24/2018 11:10 AM    Glucose (POC) 92 06/24/2018 07:19 AM     Lab Results   Component Value Date/Time    Color DARK YELLOW 06/15/2018 07:48 PM    Appearance TURBID (A) 06/15/2018 07:48 PM    Specific gravity 1.024 06/15/2018 07:48 PM    pH (UA) 5.0 06/15/2018 07:48 PM    Protein 30 (A) 06/15/2018 07:48 PM    Glucose NEGATIVE  06/15/2018 07:48 PM    Ketone TRACE (A) 06/15/2018 07:48 PM    Urobilinogen 1.0 06/15/2018 07:48 PM    Nitrites NEGATIVE  06/15/2018 07:48 PM    Leukocyte Esterase LARGE (A) 06/15/2018 07:48 PM    Epithelial cells MODERATE (A) 06/15/2018 07:48 PM    Bacteria 4+ (A) 06/15/2018 07:48 PM    WBC 20-50 06/15/2018 07:48 PM    RBC 0-5 06/15/2018 07:48 PM     Medications Reviewed:     Current Facility-Administered Medications   Medication Dose Route Frequency    acetaminophen (TYLENOL) tablet 650 mg  650 mg Oral Q6H    scopolamine (TRANSDERM-SCOP) 1 mg over 3 days 1 Patch  1 Patch TransDERmal Q72H    glucose chewable tablet 16 g  4 Tab Oral PRN    dextrose (D50W) injection syrg 12.5-25 g  12.5-25 g IntraVENous PRN    glucagon (GLUCAGEN) injection 1 mg  1 mg IntraMUSCular PRN    dextrose 5% infusion  25 mL/hr IntraVENous CONTINUOUS    cholestyramine-aspartame (QUESTRAN LIGHT) packet 4 g  4 g Oral BID WITH MEALS    potassium chloride (KAON 10%) 20 mEq/15 mL oral liquid 40 mEq  40 mEq Oral DAILY    lactobac ac& pc-s.therm-b.anim (RAYSHAWN Q/RISAQUAD)  1 Cap Oral DAILY    lisinopril (PRINIVIL, ZESTRIL) tablet 5 mg  5 mg Oral DAILY    pantoprazole (PROTONIX) 2 mg/mL oral suspension 40 mg  40 mg Oral ACB    hydrALAZINE (APRESOLINE) 20 mg/mL injection 10 mg  10 mg IntraVENous Q6H PRN    loperamide (IMODIUM) capsule 2 mg  2 mg Oral Q4H PRN    albuterol-ipratropium (DUO-NEB) 2.5 MG-0.5 MG/3 ML  3 mL Nebulization Q4H PRN    sodium chloride (NS) flush 5-10 mL  5-10 mL IntraVENous Q8H    sodium chloride (NS) flush 5-10 mL  5-10 mL IntraVENous PRN    naloxone (NARCAN) injection 0.4 mg  0.4 mg IntraVENous PRN    enoxaparin (LOVENOX) injection 40 mg  40 mg SubCUTAneous Q24H    ondansetron (ZOFRAN) injection 4 mg  4 mg IntraVENous Q4H PRN   ______________________________________________________________________  EXPECTED LENGTH OF STAY: 4d 21h  ACTUAL LENGTH OF STAY:          [address], NP

## 2018-06-25 NOTE — PROGRESS NOTES
Bedside shift change report given to Fred Valladares RN (oncoming nurse) by Iris Pfeiffer RN (offgoing nurse). Report included the following information SBAR, Kardex, Intake/Output, MAR, Recent Results and Med Rec Status.

## 2018-06-26 LAB
ANION GAP SERPL CALC-SCNC: 7 MMOL/L (ref 5–15)
BUN SERPL-MCNC: 5 MG/DL (ref 6–20)
BUN/CREAT SERPL: 16 (ref 12–20)
CALCIUM SERPL-MCNC: 7.7 MG/DL (ref 8.5–10.1)
CHLORIDE SERPL-SCNC: 107 MMOL/L (ref 97–108)
CO2 SERPL-SCNC: 24 MMOL/L (ref 21–32)
CREAT SERPL-MCNC: 0.31 MG/DL (ref 0.55–1.02)
GLUCOSE BLD STRIP.AUTO-MCNC: 106 MG/DL (ref 65–100)
GLUCOSE BLD STRIP.AUTO-MCNC: 87 MG/DL (ref 65–100)
GLUCOSE BLD STRIP.AUTO-MCNC: 90 MG/DL (ref 65–100)
GLUCOSE BLD STRIP.AUTO-MCNC: 96 MG/DL (ref 65–100)
GLUCOSE SERPL-MCNC: 87 MG/DL (ref 65–100)
MAGNESIUM SERPL-MCNC: 2 MG/DL (ref 1.6–2.4)
POTASSIUM SERPL-SCNC: 3.2 MMOL/L (ref 3.5–5.1)
SERVICE CMNT-IMP: ABNORMAL
SERVICE CMNT-IMP: NORMAL
SODIUM SERPL-SCNC: 138 MMOL/L (ref 136–145)

## 2018-06-26 PROCEDURE — 74011000258 HC RX REV CODE- 258: Performed by: NURSE PRACTITIONER

## 2018-06-26 PROCEDURE — 83735 ASSAY OF MAGNESIUM: CPT | Performed by: NURSE PRACTITIONER

## 2018-06-26 PROCEDURE — 74011250637 HC RX REV CODE- 250/637: Performed by: NURSE PRACTITIONER

## 2018-06-26 PROCEDURE — 82962 GLUCOSE BLOOD TEST: CPT

## 2018-06-26 PROCEDURE — 74011250636 HC RX REV CODE- 250/636: Performed by: NURSE PRACTITIONER

## 2018-06-26 PROCEDURE — 74011250636 HC RX REV CODE- 250/636: Performed by: HOSPITALIST

## 2018-06-26 PROCEDURE — 74011250637 HC RX REV CODE- 250/637: Performed by: INTERNAL MEDICINE

## 2018-06-26 PROCEDURE — 80048 BASIC METABOLIC PNL TOTAL CA: CPT | Performed by: NURSE PRACTITIONER

## 2018-06-26 PROCEDURE — 65270000029 HC RM PRIVATE

## 2018-06-26 PROCEDURE — 36415 COLL VENOUS BLD VENIPUNCTURE: CPT | Performed by: NURSE PRACTITIONER

## 2018-06-26 RX ORDER — POTASSIUM CHLORIDE 20MEQ/15ML
40 LIQUID (ML) ORAL 2 TIMES DAILY WITH MEALS
Status: DISCONTINUED | OUTPATIENT
Start: 2018-06-26 | End: 2018-06-27 | Stop reason: HOSPADM

## 2018-06-26 RX ORDER — LOPERAMIDE HYDROCHLORIDE 2 MG/1
2 CAPSULE ORAL EVERY 6 HOURS
Status: COMPLETED | OUTPATIENT
Start: 2018-06-26 | End: 2018-06-26

## 2018-06-26 RX ORDER — POTASSIUM CHLORIDE AND SODIUM CHLORIDE 450; 150 MG/100ML; MG/100ML
INJECTION, SOLUTION INTRAVENOUS CONTINUOUS
Status: DISCONTINUED | OUTPATIENT
Start: 2018-06-26 | End: 2018-06-27 | Stop reason: HOSPADM

## 2018-06-26 RX ADMIN — LOPERAMIDE HYDROCHLORIDE 2 MG: 2 CAPSULE ORAL at 09:48

## 2018-06-26 RX ADMIN — OXYCODONE HYDROCHLORIDE 2.5 MG: 5 TABLET ORAL at 15:52

## 2018-06-26 RX ADMIN — ACETAMINOPHEN 650 MG: 325 TABLET ORAL at 22:58

## 2018-06-26 RX ADMIN — SODIUM CHLORIDE 50 ML/HR: 450 INJECTION, SOLUTION INTRAVENOUS at 07:09

## 2018-06-26 RX ADMIN — ENOXAPARIN SODIUM 40 MG: 100 INJECTION SUBCUTANEOUS at 22:58

## 2018-06-26 RX ADMIN — LOPERAMIDE HYDROCHLORIDE 2 MG: 2 CAPSULE ORAL at 18:21

## 2018-06-26 RX ADMIN — SODIUM CHLORIDE AND POTASSIUM CHLORIDE: 4.5; 1.49 INJECTION, SOLUTION INTRAVENOUS at 09:58

## 2018-06-26 RX ADMIN — LISINOPRIL 5 MG: 5 TABLET ORAL at 09:48

## 2018-06-26 RX ADMIN — PANTOPRAZOLE SODIUM 40 MG: 40 TABLET, DELAYED RELEASE ORAL at 07:07

## 2018-06-26 RX ADMIN — Medication 10 ML: at 22:58

## 2018-06-26 RX ADMIN — POTASSIUM CHLORIDE 40 MEQ: 40 SOLUTION ORAL at 18:21

## 2018-06-26 RX ADMIN — LOPERAMIDE HYDROCHLORIDE 2 MG: 2 CAPSULE ORAL at 12:26

## 2018-06-26 RX ADMIN — Medication 10 ML: at 13:06

## 2018-06-26 RX ADMIN — CHOLESTYRAMINE 4 G: 4 POWDER, FOR SUSPENSION ORAL at 12:29

## 2018-06-26 RX ADMIN — ACETAMINOPHEN 650 MG: 325 TABLET ORAL at 07:06

## 2018-06-26 RX ADMIN — HYDRALAZINE HYDROCHLORIDE 10 MG: 20 INJECTION INTRAMUSCULAR; INTRAVENOUS at 22:58

## 2018-06-26 RX ADMIN — ACETAMINOPHEN 650 MG: 325 TABLET ORAL at 18:21

## 2018-06-26 RX ADMIN — Medication 1 CAPSULE: at 09:48

## 2018-06-26 RX ADMIN — POTASSIUM CHLORIDE 40 MEQ: 40 SOLUTION ORAL at 09:47

## 2018-06-26 RX ADMIN — CHOLESTYRAMINE 4 G: 4 POWDER, FOR SUSPENSION ORAL at 18:21

## 2018-06-26 RX ADMIN — CHOLESTYRAMINE 4 G: 4 POWDER, FOR SUSPENSION ORAL at 09:48

## 2018-06-26 RX ADMIN — ACETAMINOPHEN 650 MG: 325 TABLET ORAL at 10:01

## 2018-06-26 NOTE — PROGRESS NOTES
Problem: Pressure Injury - Risk of  Goal: *Prevention of pressure injury  Document Refugio Scale and appropriate interventions in the flowsheet    Air mattress  Offload heels with pillows  Barrier cream.    Outcome: Progressing Towards Goal  Pressure Injury Interventions:  Sensory Interventions: Assess changes in LOC, Avoid rigorous massage over bony prominences, Check visual cues for pain, Keep linens dry and wrinkle-free, Float heels, Minimize linen layers, Turn and reposition approx. every two hours (pillows and wedges if needed)    Moisture Interventions: Apply protective barrier, creams and emollients, Check for incontinence Q2 hours and as needed, Internal/External fecal devices, Internal/External urinary devices, Minimize layers    Activity Interventions: Pressure redistribution bed/mattress(bed type)    Mobility Interventions: Float heels, HOB 30 degrees or less, Pressure redistribution bed/mattress (bed type), Turn and reposition approx.  every two hours(pillow and wedges)    Nutrition Interventions: Document food/fluid/supplement intake, Offer support with meals,snacks and hydration    Friction and Shear Interventions: HOB 30 degrees or less, Lift sheet, Lift team/patient mobility team, Minimize layers

## 2018-06-26 NOTE — PROGRESS NOTES
Problem: Falls - Risk of  Goal: *Absence of Falls  Document Selam Fall Risk and appropriate interventions in the flowsheet.    Outcome: Progressing Towards Goal  Fall Risk Interventions:       Mentation Interventions: Adequate sleep, hydration, pain control, Door open when patient unattended, More frequent rounding, Room close to nurse's station    Medication Interventions: Evaluate medications/consider consulting pharmacy    Elimination Interventions: Call light in reach, Toileting schedule/hourly rounds

## 2018-06-26 NOTE — WOUND CARE
WOCN Note:     Follow-up visit for sacrum and heels. Chart shows:  Patient is communicative with slow speech. Requires full assists in repositioning. Nursing students in room to assist.   Patient has a Schafer and a [employer] and documentation shows a 6/16/18 insertion date. Bed: total care SPORT  Patient reports no pain.      Left heel scar tissue, dry and pink and blanching. Unchanged. Foot drop noted. Heels offloaded. Right great toe tip with dry, light burgundy flaky tissue. Hypopigmented scar tissue on right ear. Sacrum with intact hypopigmented scar tissue, very prominent sacrum & Sensicare cream in use for slight leaking around FlexiSeal.      Patient repositioned on left side.     Recommendations:    Barrier cream to buttocks and sacrum daily and as needed. Minimize layers of linen/pads under patient to optimize support surface. Turn/reposition approximately every 2 hours and offload heels.   Total care SPORT: Use only flat sheet and one incontinence pad.      Discussed above plan with patient & RN, Jordan.     Transition of Care: Plan to follow weekly and as needed while admitted to hospital.     KIKO Martínez, RN, [facility]  Certified Wound, Ostomy, Continence Nurse  office [phone]  pager [phone] or call  to page

## 2018-06-26 NOTE — PROGRESS NOTES
Bedside shift change report given to Christianne [place] (oncoming nurse) by Rosalina Martin RN (offgoing nurse). Report included the following information SBAR, Kardex, ED Summary, [facility] and Recent Results.

## 2018-06-26 NOTE — PROGRESS NOTES
NUTRITION COMPLETE ASSESSMENT    RECOMMENDATIONS:   1. Continue diet as ordered with assistance and encouragement with all meals and supplements  -- No PEG, [facility]     2. Weekly weight (last obtained 6/17)     Interventions/Plan:   Food/Nutrient Delivery: Ensure Compact (needs to be thickened); Magic Cup; meds in applesauce    Assessment:   Reason for Assessment:   [x]Reassessment     Diet:  Dysphagia 1, nectar liquids; Ensure Compact BID; Magic Cup q day  Nutritionally Significant Medications: [x] Reviewed  Meal Intake:   Patient Vitals for the past 100 hrs:   % Diet Eaten   06/26/18 0944 25 %   06/25/18 1248 0 %   06/25/18 0855 10 %   06/24/18 1728 5 %   06/24/18 1340 5 %   06/24/18 0935 5 %   06/23/18 1727 5 %   06/23/18 1331 5 %   06/23/18 0933 5 %     Current Hospitalization:   Fluid Restriction:  N/A  Appetite:  Poor  PO Ability: Total feed      Subjective:  Pt complaining of pain \"all over\". She ate lunch with assistance. Admits she likes Ensure Compact, but 3 at the bedside untouched. Objective:  Chart reviewed, discussed with RN and team during interdisciplinary rounds. Pt's intake remains poor/variable (5-10% of meals). She is a total feed. She is not meeting her estimated nutrition needs, but aggressive nutrition intervention (tube feeding) is not indicated per [facility] wishes. Rectal tube in place-- imodium ordered prn 6/18, but not given until yesterday. It is now scheduled, along with questran TID and rectal output very minimal today (only in tubing, none in bag since this morning). Pt with possible dischrage to [employer] tomorrow. Would consider diet as ordered with supplements (need to be thickened to nectar consistency) and assist at all meals. Estimated Nutrition Needs:   Kcals/day: 1107 Kcals/day (1107-1200kcal)  Protein: 60 g (60-70g (1.2-1.4g/kg))  Fluid: 1250 ml (25ml/kg)  Based On:  Barron Becker (x 1.2-1.3)  Weight Used: Actual wt (49.2kg)    Pt expected to meet estimated nutrient needs:  []   Yes     [x]  No  Nutrition Diagnosis:   1. Inadequate protein-energy intake related to poor appetite, self-feeding difficulty as evidenced by less than 25% meals consumed; total feed    2. Swallowing difficulty related to dysphagia as evidenced by puree, nectar-thick; high risk for aspiration      Goals:     Pt to consume at least 2 supplements per day over the next 5-7 days     Monitoring & Evaluation:    - Total energy intake, Liquid meal replacement   - Weight/weight change     Previous Nutrition Goals Met:   N/A  Previous Recommendations:    Progressing    Education & Discharge Needs:   [x] None Identified   [] Identified and addressed    [x] Participated in care plan, discharge planning, and/or interdisciplinary rounds        Cultural, Islam and ethnic food preferences identified:  NONE    Skin Integrity: []Intact  [x]Other  Edema: [x]None []Other  Last BM: flexiseal (output improving)  Food Allergies: [x]None []Other    Anthropometrics:    Weight Loss Metrics 6/17/2018   Today's Wt 108 lb 7.5 oz   BMI 19.84 kg/m2      Weight Source: Bed  Height: 5' 2\" (157.5 cm),    Body mass index is 19.84 kg/(m^2).   IBW : 49.9 kg (110 lb),     ,      Labs:    Lab Results   Component Value Date/Time    Sodium 138 06/26/2018 03:54 AM    Potassium 3.2 (L) 06/26/2018 03:54 AM    Chloride 107 06/26/2018 03:54 AM    CO2 24 06/26/2018 03:54 AM    Glucose 87 06/26/2018 03:54 AM    BUN 5 (L) 06/26/2018 03:54 AM    Creatinine 0.31 (L) 06/26/2018 03:54 AM    Calcium 7.7 (L) 06/26/2018 03:54 AM    Magnesium 2.0 06/26/2018 03:54 AM    Phosphorus 2.7 06/25/2018 04:40 AM    Albumin 2.8 (L) 06/15/2018 06:36 PM     Kathy Dietz, [address]

## 2018-06-26 NOTE — PROGRESS NOTES
Follow up visit with Ms. Martha Narayanan. Pt wondered about the reason for my visit - reassured her that I was a member of her care team and that my role was to offer spiritual support as needed. Pt expressed appreciation for assurance of prayers. Chart reviewed and noted that pt completed AMD with palliative team yesterday. During my visit, pt asked several times for her nurse; message relayed. Chaplains are available for support as needed. Will request visit from music therapist as able.     Brenda Gauthier, Palliative

## 2018-06-27 VITALS
SYSTOLIC BLOOD PRESSURE: 165 MMHG | TEMPERATURE: 98.1 F | DIASTOLIC BLOOD PRESSURE: 93 MMHG | BODY MASS INDEX: 19.96 KG/M2 | RESPIRATION RATE: 18 BRPM | OXYGEN SATURATION: 100 % | HEART RATE: 77 BPM | WEIGHT: 108.47 LBS | HEIGHT: 62 IN

## 2018-06-27 PROBLEM — T83.511A CATHETER-ASSOCIATED URINARY TRACT INFECTION (HCC): Status: RESOLVED | Noted: 2018-06-15 | Resolved: 2018-06-27

## 2018-06-27 PROBLEM — A41.9 SEPSIS (HCC): Status: RESOLVED | Noted: 2018-06-15 | Resolved: 2018-06-27

## 2018-06-27 PROBLEM — R19.7 DIARRHEA: Status: RESOLVED | Noted: 2018-06-15 | Resolved: 2018-06-27

## 2018-06-27 PROBLEM — N39.0 CATHETER-ASSOCIATED URINARY TRACT INFECTION (HCC): Status: RESOLVED | Noted: 2018-06-15 | Resolved: 2018-06-27

## 2018-06-27 LAB
ANION GAP SERPL CALC-SCNC: 9 MMOL/L (ref 5–15)
BUN SERPL-MCNC: 5 MG/DL (ref 6–20)
BUN/CREAT SERPL: 13 (ref 12–20)
CALCIUM SERPL-MCNC: 8.8 MG/DL (ref 8.5–10.1)
CHLORIDE SERPL-SCNC: 112 MMOL/L (ref 97–108)
CO2 SERPL-SCNC: 23 MMOL/L (ref 21–32)
CREAT SERPL-MCNC: 0.4 MG/DL (ref 0.55–1.02)
GLUCOSE BLD STRIP.AUTO-MCNC: 97 MG/DL (ref 65–100)
GLUCOSE SERPL-MCNC: 101 MG/DL (ref 65–100)
POTASSIUM SERPL-SCNC: 4.1 MMOL/L (ref 3.5–5.1)
SERVICE CMNT-IMP: NORMAL
SODIUM SERPL-SCNC: 144 MMOL/L (ref 136–145)

## 2018-06-27 PROCEDURE — 74011250636 HC RX REV CODE- 250/636: Performed by: NURSE PRACTITIONER

## 2018-06-27 PROCEDURE — 80048 BASIC METABOLIC PNL TOTAL CA: CPT | Performed by: NURSE PRACTITIONER

## 2018-06-27 PROCEDURE — 74011250637 HC RX REV CODE- 250/637: Performed by: NURSE PRACTITIONER

## 2018-06-27 PROCEDURE — 74011250637 HC RX REV CODE- 250/637: Performed by: INTERNAL MEDICINE

## 2018-06-27 PROCEDURE — 82962 GLUCOSE BLOOD TEST: CPT

## 2018-06-27 PROCEDURE — 36415 COLL VENOUS BLD VENIPUNCTURE: CPT | Performed by: NURSE PRACTITIONER

## 2018-06-27 RX ORDER — OXYCODONE HYDROCHLORIDE 5 MG/1
2.5 TABLET ORAL
Qty: 10 TAB | Refills: 0 | Status: SHIPPED | OUTPATIENT
Start: 2018-06-27

## 2018-06-27 RX ORDER — CHOLESTYRAMINE 4 G/4.8G
4 POWDER, FOR SUSPENSION ORAL 2 TIMES DAILY
Qty: 60 PACKET | Refills: 0 | Status: SHIPPED | OUTPATIENT
Start: 2018-06-27

## 2018-06-27 RX ORDER — POTASSIUM CHLORIDE 20 MEQ/1
40 TABLET, EXTENDED RELEASE ORAL DAILY
Qty: 60 TAB | Refills: 0 | Status: SHIPPED
Start: 2018-06-27

## 2018-06-27 RX ADMIN — PANTOPRAZOLE SODIUM 40 MG: 40 TABLET, DELAYED RELEASE ORAL at 06:49

## 2018-06-27 RX ADMIN — LISINOPRIL 5 MG: 5 TABLET ORAL at 08:51

## 2018-06-27 RX ADMIN — Medication 1 CAPSULE: at 08:51

## 2018-06-27 RX ADMIN — LOPERAMIDE HYDROCHLORIDE 2 MG: 2 CAPSULE ORAL at 08:57

## 2018-06-27 RX ADMIN — ACETAMINOPHEN 650 MG: 325 TABLET ORAL at 04:29

## 2018-06-27 RX ADMIN — SODIUM CHLORIDE AND POTASSIUM CHLORIDE: 4.5; 1.49 INJECTION, SOLUTION INTRAVENOUS at 06:49

## 2018-06-27 RX ADMIN — POTASSIUM CHLORIDE 40 MEQ: 40 SOLUTION ORAL at 08:50

## 2018-06-27 RX ADMIN — Medication 10 ML: at 04:29

## 2018-06-27 RX ADMIN — CHOLESTYRAMINE 4 G: 4 POWDER, FOR SUSPENSION ORAL at 08:50

## 2018-06-27 RX ADMIN — OXYCODONE HYDROCHLORIDE 2.5 MG: 5 TABLET ORAL at 06:57

## 2018-06-27 NOTE — DISCHARGE SUMMARY
Discharge Summary     PATIENT ID: Fran Byrnes  MRN: [id number]   YOB: 1940    DATE OF ADMISSION: 6/15/2018  5:54 PM    DATE OF DISCHARGE: 6/27/2018  PRIMARY CARE PROVIDER: Abner Zamudio MD   ATTENDING PHYSICIAN: Vanessa Dixon MD  DISCHARGING PROVIDER: Ellen Diaz NP. To contact this individual call [phone] and ask the  to page. If unavailable ask to be transferred the Adult Hospitalist Department. CONSULTATIONS: IP CONSULT TO HOSPITALIST  IP CONSULT TO PALLIATIVE CARE - PROVIDER    ADMITTING [address] [place] [address] COURSE:   Pt presented to the ED with fever. The patient was found to have temperature of 104 the am of admit at her LTC facility - she was given tylenol which brought down her temp to 100.3. She also had an episode of vomiting/poor appetite and her urine was darker than usual.  EMS indicated that staff at [address] [place] [address] was concerned pt aspirated. DISCHARGE DIAGNOSES / PLAN:      Severe sepsis (hypotension, fever, lactic acidosis, leukocytosis with UTI) POA: - Resolved  - + pyuria. UA was turbid with + large leuks, WBC 20-50 and 4+ bacteria. UTI has been treated with Rocephin  - + watery liquid stool - c-diff NEGATIVE -> Flagyl stopped (6/17)  - lactic acidosis resolved      Dysphagia:  - speech evaluated: puree diet with nectar thick liquids     CAUTI (long-term chronic mosquera) (POA):  - E-coli and Providenecia UTI - treated with 5 days of ceftriaxone  - ED changed catheter per nursing report     Hypokalemia: increase daily K on discharge     Hypernatremia: resolved     Hx of hypertension: resume home meds     Diarrhea (POA):  - stool culture is negative, c-diff negative  - lactose free diet (added restriction to diet order)  - Recommend immodium when needed.   - questran BID if pt able to tolerate     BENNY/Dehydration with elevated Creatinine: Resolved     Hx Brain injury with functional quadriplegia:    - Bed bound, Supportive care, reposition patient  - communicates with simple words     Failure to Thrive  - BMI 19.84, normal weight  - decreased intake 2/2 dysphagia  - nephew and patient do not want PEG in future    Palliative care has seen pt this admit, mPOA paperwork completed naming Nereyda Almodovar (nephew) her mPOA. DDNR completed. Nephew is aware that if pt continues to decline, pt may be appropriate for hospice services. FOLLOW UP APPOINTMENTS:    Follow-up Information     Follow up With Details Comments Contact Info    Raman Stoddard MD In 1 week  [address] [address] [phone]           ADDITIONAL CARE RECOMMENDATIONS:   - consult to Dietitian  - encourage po intakes - pt has not been eating well during admit    DIET: Pureed Diet, Nectar Thick. Lactose Free  Oral Nutritional Supplements: Ensure 2-3 times daily    ACTIVITY: Bedrest, non ambulatory    DISCHARGE MEDICATIONS:  Current Discharge Medication List      START taking these medications    Details   L. acidoph & paracasei- S therm- Bifido (RAYSHAWN-Q/RISAQUAD) 8 billion cell cap cap Take 1 Cap by mouth daily. Qty: 30 Cap, Refills: 0      cholestyramine-aspartame (QUESTRAN LIGHT) 4 gram packet Take 1 Packet by mouth two (2) times a day. Qty: 60 Packet, Refills: 0      oxyCODONE IR (ROXICODONE) 5 mg immediate release tablet Take 0.5 Tabs by mouth every four (4) hours as needed. Max Daily Amount: 15 mg.  Qty: 10 Tab, Refills: 0    Associated Diagnoses: Flexion contractures         CONTINUE these medications which have CHANGED    Details   potassium chloride (K-DUR, KLOR-CON) 20 mEq tablet Take 2 Tabs by mouth daily. Qty: 60 Tab, Refills: 0         CONTINUE these medications which have NOT CHANGED    Details   acetic acid 0.25 % irrigation by Irrigation route daily. Mix 30 mL of acetic acid 0.25% with 60 mL of water and use to irrigate mosquera catheter 3x a week on M-W-F      atropine 1 % ophthalmic solution 1-2 Drops by SubLINGual route three (3) times daily.  Scheduled before meals-for secretions      acetaminophen (TYLENOL) 325 mg tablet Take 650 mg by mouth every four (4) hours as needed for Pain (Do not exceed 3 g /24 hours). sodium chloride (DEEP SEA NASAL NA) 1 [place] by Nasal route every two (2) hours as needed for Other (congestion). gabapentin (NEURONTIN) 100 mg capsule Take 100 mg by mouth three (3) times daily. hydroCHLOROthiazide (HYDRODIURIL) 12.5 mg tablet Take 12.5 mg by mouth daily. albuterol-ipratropium (DUO-NEB) 2.5 mg-0.5 mg/3 ml nebu 3 mL by Nebulization route every four (4) hours as needed. lisinopril (PRINIVIL, ZESTRIL) 2.5 mg tablet Take 2.5 mg by mouth daily. loperamide (IMODIUM) 2 mg capsule Take  by mouth four (4) times daily as needed for Diarrhea.      loratadine (CLARITIN) 10 mg tablet Take 10 mg by mouth daily as needed for Allergies. pantoprazole (PROTONIX) 40 mg tablet Take 40 mg by mouth daily. promethazine (PHENERGAN) 25 mg tablet Take 25 mg by mouth every four (4) hours as needed for Nausea. PSEUDOEPHEDRINE-dextromethorphan-guaiFENesin (ROBAFEN CF) 30- mg/5 mL solution Take 5 mL by mouth every four (4) hours as needed for Cough. therapeutic multivitamin (THERAGRAN) tablet Take 1 Tab by mouth daily. tamsulosin (FLOMAX) 0.4 mg capsule Take 0.4 mg by mouth nightly. Indications: urinary retention      cyanocobalamin/folic ac/vit B6 (VIRT-GURWINDER PO) Take 1 Tab by mouth daily. Supplement         STOP taking these medications       oxyCODONE-acetaminophen (PERCOCET) 5-325 mg per tablet Comments:   Reason for Stopping:         senna (SENEXON) 8.6 mg tablet Comments:   Reason for Stopping:             NOTIFY YOUR PHYSICIAN FOR ANY OF THE FOLLOWING:   Fever over 101 degrees for 24 hours. Chest pain, shortness of breath, fever, chills, nausea, vomiting, diarrhea, change in mentation, falling, weakness, bleeding. Severe pain or pain not relieved by medications.   Or, any other signs or symptoms that you may have questions about. DISPOSITION:    Home With:   OT  PT  HH  RN      xx Long term SNF/Inpatient Rehab    Independent/assisted living    Hospice    Other:     PATIENT CONDITION AT DISCHARGE:   Functional status   xx Poor     Deconditioned     Independent      Cognition   xx  Lucid     Forgetful     Dementia      Catheters/lines (plus indication)   xx Schafer     PICC     PEG     None      Code status     Full code    xx DNR      PHYSICAL EXAMINATION AT DISCHARGE:  BP (!) 165/93 (BP 1 Location: Right leg, BP Patient Position: At rest)  Pulse 77  Temp 98.1 °F (36.7 °C)  Resp 18  Ht 5' 2\" (1.575 m)  Wt 49.2 kg (108 lb 7.5 oz)  SpO2 100%  BMI 19.84 kg/m2     Pt in bed, turned by turn team and reports she is comfortable. Called nephew and discussed plans for discharge today - he is in agreement and verbalizes understanding that hospice services may be requested once patient returns to [facility], if she continues to decline. Constitutional:  No acute distress, cooperative   ENT:  Oral mucous membranes moist. Lower teeth w/thick plaque. Lower jaw and lip protrudes   Resp:  CTA bilaterally. No wheezing. No accessory muscle use and on RA   CV:  Regular rhythm, normal rate, no murmurs. GI:  Soft, non distended, non-tender. Normoactive bowel sounds. + Flexiseal in place, decreased output and has orders to d/c. Musculoskeletal:  No edema, warm, 2+ pulses throughout    Neurologic:  Moves all extremities. Alert and oriented to person/family, place and time   Lines: Schafer (chronic) with hazy yellow urine.  Changed on admit.       CHRONIC MEDICAL DIAGNOSES:  Problem List as of 6/27/2018  Date Reviewed: 6/27/2018          Codes Class Noted - Resolved    Functional quadriplegia ([facility] [address] ICD-10-CM: R53.2  ICD-9-CM: 780.72  6/15/2018 - Present        * (Principal)RESOLVED: Sepsis ([facility] [address] ICD-10-CM: A41.9  ICD-9-CM: 038.9, 995.91  6/15/2018 - 6/27/2018        RESOLVED: Catheter-associated urinary tract infection ([facility] [address] ICD-10-CM: T83.511A, N39.0  ICD-9-CM: 996.64, 599.0  6/15/2018 - 6/27/2018        RESOLVED: Diarrhea ICD-10-CM: R19.7  ICD-9-CM: 787.91  6/15/2018 - 6/27/2018            Greater than 30 minutes were spent with the patient on counseling and coordination of care    Signed:   Annie Gonzalez NP  6/27/2018  8:34 AM

## 2018-06-27 NOTE — DISCHARGE INSTRUCTIONS
Discharge Summary      PATIENT ID: Sam Tucker  MRN: [id number]   YOB: 1940    DATE OF ADMISSION: 6/15/2018  5:54 PM    DATE OF DISCHARGE: 6/27/2018  PRIMARY CARE PROVIDER: Lyn Gutierrez MD   ATTENDING PHYSICIAN: Malathi Osman MD  DISCHARGING PROVIDER: Beth Arauz NP. To contact this individual call [phone] and ask the  to page. If unavailable ask to be transferred the Adult Hospitalist Department.     CONSULTATIONS: IP CONSULT TO HOSPITALIST  IP CONSULT TO [address] [place] [address] [address]:   Pt presented to the ED with fever. The patient was found to have temperature of 104 the am of admit at her LTC facility - she was given tylenol which brought down her temp to 100.3. She also had an episode of vomiting/poor appetite and her urine was darker than usual.  EMS indicated that staff at [address] [place] [address] was concerned pt aspirated.     DISCHARGE DIAGNOSES / PLAN:       Severe sepsis (hypotension, fever, lactic acidosis, leukocytosis with UTI) POA: - Resolved  - + pyuria. UA was turbid with + large leuks, WBC 20-50 and 4+ bacteria. UTI has been treated with Rocephin  - + watery liquid stool - c-diff NEGATIVE -> Flagyl stopped (6/17)  - lactic acidosis resolved       Dysphagia:  - speech evaluated: puree diet with nectar thick liquids      CAUTI (long-term chronic mosquera) (POA):  - E-coli and Providenecia UTI - treated with 5 days of ceftriaxone  - ED changed catheter per nursing report      Hypokalemia: increase daily K on discharge      Hypernatremia: resolved      Hx of hypertension: resume home meds      Diarrhea (POA):  - stool culture is negative, c-diff negative  - lactose free diet (added restriction to diet order)  - Recommend immodium when needed.   - questran BID if pt able to tolerate      BENNY/Dehydration with elevated Creatinine: Resolved      Hx Brain injury with functional quadriplegia:    - Bed bound, Supportive care, reposition patient  - communicates with simple words      Failure to Thrive  - BMI 19.84, normal weight  - decreased intake 2/2 dysphagia  - nephew and patient do not want PEG in future     Palliative care has seen pt this admit, mPOA paperwork completed naming Kimberli Ava (nephew) her mPOA. DDNR completed. Nephew is aware that if pt continues to decline, pt may be appropriate for hospice services.      FOLLOW UP APPOINTMENTS:    Follow-up Information     Follow up With Details Comments Contact Info     Claudeen Stabile, MD In 1 week   [address] [facility] [address] [phone]         ADDITIONAL CARE RECOMMENDATIONS:   - consult to Dietitian  - encourage po intakes - pt has not been eating well during admit     DIET: Pureed Diet, Nectar Thick. Lactose Free  Oral Nutritional Supplements: Ensure 2-3 times daily     ACTIVITY: Bedrest, non ambulatory     DISCHARGE MEDICATIONS:        Current Discharge Medication List             START taking these medications     Details   L. acidoph & paracasei- S therm- Bifido (RAYSHAWN-Q/RISAQUAD) 8 billion cell cap cap Take 1 Cap by mouth daily. Qty: 30 Cap, Refills: 0       cholestyramine-aspartame (QUESTRAN LIGHT) 4 gram packet Take 1 Packet by mouth two (2) times a day. Qty: 60 Packet, Refills: 0       oxyCODONE IR (ROXICODONE) 5 mg immediate release tablet Take 0.5 Tabs by mouth every four (4) hours as needed. Max Daily Amount: 15 mg.  Qty: 10 Tab, Refills: 0     Associated Diagnoses: Flexion contractures                 CONTINUE these medications which have CHANGED     Details   potassium chloride (K-DUR, KLOR-CON) 20 mEq tablet Take 2 Tabs by mouth daily. Qty: 60 Tab, Refills: 0                 CONTINUE these medications which have NOT CHANGED     Details   acetic acid 0.25 % irrigation by Irrigation route daily.  Mix 30 mL of acetic acid 0.25% with 60 mL of water and use to irrigate mosquera catheter 3x a week on M-W-F       atropine 1 % ophthalmic solution 1-2 Drops by SubLINGual route three (3) times daily. Scheduled before meals-for secretions       acetaminophen (TYLENOL) 325 mg tablet Take 650 mg by mouth every four (4) hours as needed for Pain (Do not exceed 3 g /24 hours).       sodium chloride (DEEP SEA NASAL NA) 1 [place] by Nasal route every two (2) hours as needed for Other (congestion).       gabapentin (NEURONTIN) 100 mg capsule Take 100 mg by mouth three (3) times daily.       hydroCHLOROthiazide (HYDRODIURIL) 12.5 mg tablet Take 12.5 mg by mouth daily.       albuterol-ipratropium (DUO-NEB) 2.5 mg-0.5 mg/3 ml nebu 3 mL by Nebulization route every four (4) hours as needed.       lisinopril (PRINIVIL, ZESTRIL) 2.5 mg tablet Take 2.5 mg by mouth daily.       loperamide (IMODIUM) 2 mg capsule Take  by mouth four (4) times daily as needed for Diarrhea.       loratadine (CLARITIN) 10 mg tablet Take 10 mg by mouth daily as needed for Allergies.       pantoprazole (PROTONIX) 40 mg tablet Take 40 mg by mouth daily.       promethazine (PHENERGAN) 25 mg tablet Take 25 mg by mouth every four (4) hours as needed for Nausea.       PSEUDOEPHEDRINE-dextromethorphan-guaiFENesin (ROBAFEN CF) 30- mg/5 mL solution Take 5 mL by mouth every four (4) hours as needed for Cough.       therapeutic multivitamin (THERAGRAN) tablet Take 1 Tab by mouth daily.       tamsulosin (FLOMAX) 0.4 mg capsule Take 0.4 mg by mouth nightly. Indications: urinary retention       cyanocobalamin/folic ac/vit B6 (VIRT-GURWINDER PO) Take 1 Tab by mouth daily. Supplement                STOP taking these medications         oxyCODONE-acetaminophen (PERCOCET) 5-325 mg per tablet Comments:   Reason for Stopping:            senna (SENEXON) 8.6 mg tablet Comments:   Reason for Stopping:                 NOTIFY YOUR PHYSICIAN FOR ANY OF THE FOLLOWING:   Fever over 101 degrees for 24 hours. Chest pain, shortness of breath, fever, chills, nausea, vomiting, diarrhea, change in mentation, falling, weakness, bleeding.  Severe pain or pain not relieved by medications. Or, any other signs or symptoms that you may have questions about.     DISPOSITION:     Home With:    OT   PT   HH   RN      xx Long term SNF/Inpatient Rehab     Independent/assisted living     Hospice     Other:      PATIENT CONDITION AT DISCHARGE:   Functional status   xx Poor      Deconditioned      Independent       Cognition   xx  Lucid      Forgetful      Dementia       Catheters/lines (plus indication)   xx Schafer      PICC      PEG      None       Code status      Full code    xx DNR       PHYSICAL EXAMINATION AT DISCHARGE:  BP (!) 165/93 (BP 1 Location: Right leg, BP Patient Position: At rest)  Pulse 77  Temp 98.1 °F (36.7 °C)  Resp 18  Ht 5' 2\" (1.575 m)  Wt 49.2 kg (108 lb 7.5 oz)  SpO2 100%  BMI 19.84 kg/m2      Pt in bed, turned by turn team and reports she is comfortable. Called nephew and discussed plans for discharge today - he is in agreement and verbalizes understanding that hospice services may be requested once patient returns to [facility], if she continues to decline.      Constitutional:  No acute distress, cooperative   ENT:  Oral mucous membranes moist. Lower teeth w/thick plaque. Lower jaw and lip protrudes   Resp:  CTA bilaterally. No wheezing. No accessory muscle use and on RA   CV:  Regular rhythm, normal rate, no murmurs.    GI:  Soft, non distended, non-tender. Normoactive bowel sounds. + Flexiseal in place, decreased output and has orders to d/c.    Musculoskeletal:  No edema, warm, 2+ pulses throughout    Neurologic:  Moves all extremities.  Alert and oriented to person/family, place and time   Lines: Schafer (chronic) with hazy yellow urine.  Changed on admit.       CHRONIC MEDICAL DIAGNOSES:  Problem List as of 6/27/2018  Date Reviewed: 6/27/2018             Codes Class Noted - Resolved     Functional quadriplegia ([facility] [address] ICD-10-CM: R53.2  ICD-9-CM: 780.72   6/15/2018 - Present           * (Principal)RESOLVED: Sepsis ([facility] [address] ICD-10-CM: A41.9  ICD-9-CM: 038.9, 995.91   6/15/2018 - 6/27/2018           RESOLVED: Catheter-associated urinary tract infection ([facility]) ICD-10-CM: T83.511A, N39.0  ICD-9-CM: 996.64, 599.0   6/15/2018 - 6/27/2018           RESOLVED: Diarrhea ICD-10-CM: R19.7  ICD-9-CM: 787.91   6/15/2018 - 6/27/2018               Greater than 30 minutes were spent with the patient on counseling and coordination of care     Signed:   Rossi Brito NP  6/27/2018  8:34 AM

## 2018-06-27 NOTE — PROGRESS NOTES
Bedside shift change report given to Fred Valladares RN (oncoming nurse) by Karen Lawrence RN (offgoing nurse). Report included the following information SBAR, Kardex, Intake/Output, MAR, Recent Results and Med Rec Status. Report given during change of shift, that fecal management system was changed today (there is documentation of this being done). If fecal bag remains empty or with minimal output, will d/c fecal management system in am per Karen Lawrence.

## 2018-06-27 NOTE — PROGRESS NOTES
Patient discharged back to [facility]. Report called to Ro Ragland LPN. Rectal tube discontinued. PIV discontinued, tip intact, pt tolerated well. EMS at bedside. Pt off unit via stretcher. Medical transport to transport pt to SNF.

## 2018-06-27 NOTE — PROGRESS NOTES
Problem: Pressure Injury - Risk of  Goal: *Prevention of pressure injury  Document Refugio Scale and appropriate interventions in the flowsheet    Air mattress  Offload heels with pillows  Barrier cream.    Outcome: Progressing Towards Goal  Pressure Injury Interventions:  Sensory Interventions: Assess changes in LOC    Moisture Interventions: Apply protective barrier, creams and emollients    Activity Interventions: Pressure redistribution bed/mattress(bed type)    Mobility Interventions: Float heels    Nutrition Interventions: Document food/fluid/supplement intake    Friction and Shear Interventions: HOB 30 degrees or less

## 2023-05-19 NOTE — PROGRESS NOTES
Spiritual Care Assessment/Progress Note  [facility]      NAME: Cristela Clayton      MRN: [id number]  AGE: 68 y.o. SEX: female  Amish Affiliation: Anabaptism   Language:      6/21/2018     Total Time (in minutes): 8     Spiritual Assessment begun in [facility] through conversation with:         [x]Patient        [] Family    [] Friend(s)        Reason for Consult: Palliative Care, Initial/Spiritual Assessment     Spiritual beliefs: (Please include comment if needed)     [] Identifies with a marlen tradition:         [] Supported by a marlen community:            [] Claims no spiritual orientation:           [] Seeking spiritual identity:                [] Adheres to an individual form of spirituality:           [x] Not able to assess:                         Identified resources for coping:      [] Prayer                               [] Music                  [] Guided Imagery     [] Family/friends                 [] Pet visits     [] Devotional reading                         [x] Unknown     [] Other                                             Interventions offered during this visit: (See comments for more details)    Patient Interventions: Initial/Spiritual assessment, patient floor           Plan of Care:     [] Support spiritual and/or cultural needs    [] Support AMD and/or advance care planning process      [] Support grieving process   [] Coordinate Rites and/or Rituals    [] Coordination with community clergy   [] No spiritual needs identified at this time   [] Detailed Plan of Care below (See Comments)  [] Make referral to Music Therapy  [] Make referral to Pet Therapy     [] Make referral to Addiction services  [] Make referral to [facility]  [] Make referral to Spiritual Care Partner  [] No future visits requested        [x] Follow up visits as needed     Comments: Vis. ited with Ms. Sarah Soler after palliative Consult received.   Pt was awake, but did not respond verbally; unable to assess [Time Spent: ___ minutes] : I have spent [unfilled] minutes of time on the encounter. regarding spiritual needs. Offered words of comfort and assurance. Pt's nephew is in [place] and plans to return Sunday. Chaplains are available for support as needed; please page at [phone].     Brenda Patel, Palliative